# Patient Record
Sex: FEMALE | Employment: OTHER | URBAN - NONMETROPOLITAN AREA
[De-identification: names, ages, dates, MRNs, and addresses within clinical notes are randomized per-mention and may not be internally consistent; named-entity substitution may affect disease eponyms.]

---

## 2017-04-19 ENCOUNTER — OFFICE VISIT (OUTPATIENT)
Dept: CARDIOLOGY | Age: 77
End: 2017-04-19

## 2017-04-19 VITALS
BODY MASS INDEX: 34.39 KG/M2 | SYSTOLIC BLOOD PRESSURE: 130 MMHG | HEIGHT: 66 IN | HEART RATE: 82 BPM | DIASTOLIC BLOOD PRESSURE: 72 MMHG | WEIGHT: 214 LBS

## 2017-04-19 DIAGNOSIS — Z01.818 PRE-OP EVALUATION: ICD-10-CM

## 2017-04-19 DIAGNOSIS — E78.01 FAMILIAL HYPERCHOLESTEROLEMIA: ICD-10-CM

## 2017-04-19 DIAGNOSIS — R94.31 ABNORMAL ECG: ICD-10-CM

## 2017-04-19 DIAGNOSIS — I10 ESSENTIAL HYPERTENSION: Primary | ICD-10-CM

## 2017-04-19 DIAGNOSIS — Z01.811 PRE-OP CHEST EXAM: ICD-10-CM

## 2017-04-19 DIAGNOSIS — N28.9 RENAL INSUFFICIENCY: ICD-10-CM

## 2017-04-19 PROCEDURE — 4040F PNEUMOC VAC/ADMIN/RCVD: CPT | Performed by: NUCLEAR MEDICINE

## 2017-04-19 PROCEDURE — 1123F ACP DISCUSS/DSCN MKR DOCD: CPT | Performed by: NUCLEAR MEDICINE

## 2017-04-19 PROCEDURE — G8427 DOCREV CUR MEDS BY ELIG CLIN: HCPCS | Performed by: NUCLEAR MEDICINE

## 2017-04-19 PROCEDURE — 3017F COLORECTAL CA SCREEN DOC REV: CPT | Performed by: NUCLEAR MEDICINE

## 2017-04-19 PROCEDURE — 1036F TOBACCO NON-USER: CPT | Performed by: NUCLEAR MEDICINE

## 2017-04-19 PROCEDURE — G8417 CALC BMI ABV UP PARAM F/U: HCPCS | Performed by: NUCLEAR MEDICINE

## 2017-04-19 PROCEDURE — G8400 PT W/DXA NO RESULTS DOC: HCPCS | Performed by: NUCLEAR MEDICINE

## 2017-04-19 PROCEDURE — 1090F PRES/ABSN URINE INCON ASSESS: CPT | Performed by: NUCLEAR MEDICINE

## 2017-04-19 PROCEDURE — 99204 OFFICE O/P NEW MOD 45 MIN: CPT | Performed by: NUCLEAR MEDICINE

## 2017-04-19 RX ORDER — M-VIT,TX,IRON,MINS/CALC/FOLIC 27MG-0.4MG
1 TABLET ORAL DAILY
COMMUNITY

## 2017-04-19 ASSESSMENT — ENCOUNTER SYMPTOMS
PHOTOPHOBIA: 0
CONSTIPATION: 0
BLOOD IN STOOL: 0
RECTAL PAIN: 0
ABDOMINAL PAIN: 0
ANAL BLEEDING: 0
ABDOMINAL DISTENTION: 0
SHORTNESS OF BREATH: 1
NAUSEA: 0
COLOR CHANGE: 0
DIARRHEA: 0
CHEST TIGHTNESS: 0
BACK PAIN: 0
VOMITING: 0

## 2017-05-02 ENCOUNTER — TELEPHONE (OUTPATIENT)
Dept: CARDIOLOGY | Age: 77
End: 2017-05-02

## 2019-07-25 ENCOUNTER — OFFICE VISIT (OUTPATIENT)
Dept: CARDIOLOGY CLINIC | Age: 79
End: 2019-07-25
Payer: MEDICARE

## 2019-07-25 VITALS
SYSTOLIC BLOOD PRESSURE: 132 MMHG | BODY MASS INDEX: 34.99 KG/M2 | DIASTOLIC BLOOD PRESSURE: 78 MMHG | HEART RATE: 98 BPM | WEIGHT: 210 LBS | HEIGHT: 65 IN

## 2019-07-25 DIAGNOSIS — I10 ESSENTIAL HYPERTENSION: ICD-10-CM

## 2019-07-25 DIAGNOSIS — E78.5 HYPERLIPIDEMIA, UNSPECIFIED HYPERLIPIDEMIA TYPE: ICD-10-CM

## 2019-07-25 DIAGNOSIS — N18.4 CHRONIC KIDNEY DISEASE, STAGE IV (SEVERE) (HCC): ICD-10-CM

## 2019-07-25 DIAGNOSIS — R06.02 SOB (SHORTNESS OF BREATH) ON EXERTION: Primary | ICD-10-CM

## 2019-07-25 DIAGNOSIS — R53.83 FATIGUE, UNSPECIFIED TYPE: ICD-10-CM

## 2019-07-25 PROCEDURE — 1036F TOBACCO NON-USER: CPT | Performed by: NURSE PRACTITIONER

## 2019-07-25 PROCEDURE — 4040F PNEUMOC VAC/ADMIN/RCVD: CPT | Performed by: NURSE PRACTITIONER

## 2019-07-25 PROCEDURE — G8427 DOCREV CUR MEDS BY ELIG CLIN: HCPCS | Performed by: NURSE PRACTITIONER

## 2019-07-25 PROCEDURE — 99213 OFFICE O/P EST LOW 20 MIN: CPT | Performed by: NURSE PRACTITIONER

## 2019-07-25 PROCEDURE — 1090F PRES/ABSN URINE INCON ASSESS: CPT | Performed by: NURSE PRACTITIONER

## 2019-07-25 PROCEDURE — G8417 CALC BMI ABV UP PARAM F/U: HCPCS | Performed by: NURSE PRACTITIONER

## 2019-07-25 PROCEDURE — G8400 PT W/DXA NO RESULTS DOC: HCPCS | Performed by: NURSE PRACTITIONER

## 2019-07-25 PROCEDURE — 93000 ELECTROCARDIOGRAM COMPLETE: CPT | Performed by: NURSE PRACTITIONER

## 2019-07-25 PROCEDURE — 1123F ACP DISCUSS/DSCN MKR DOCD: CPT | Performed by: NURSE PRACTITIONER

## 2019-07-25 RX ORDER — AMLODIPINE BESYLATE AND BENAZEPRIL HYDROCHLORIDE 2.5; 1 MG/1; MG/1
1 CAPSULE ORAL DAILY
COMMUNITY
Start: 2019-06-11 | End: 2019-09-03

## 2019-07-25 RX ORDER — AMLODIPINE BESYLATE AND BENAZEPRIL HYDROCHLORIDE 5; 10 MG/1; MG/1
1 CAPSULE ORAL
COMMUNITY
End: 2019-07-25

## 2019-08-14 ENCOUNTER — HOSPITAL ENCOUNTER (OUTPATIENT)
Dept: NON INVASIVE DIAGNOSTICS | Age: 79
Discharge: HOME | End: 2019-08-14
Payer: MEDICARE

## 2019-08-14 DIAGNOSIS — R06.02 SOB (SHORTNESS OF BREATH) ON EXERTION: ICD-10-CM

## 2019-08-14 DIAGNOSIS — R53.83 FATIGUE, UNSPECIFIED TYPE: ICD-10-CM

## 2019-08-14 LAB
LV EF: 55 %
LVEF MODALITY: NORMAL

## 2019-08-14 PROCEDURE — 2709999900 HC NON-CHARGEABLE SUPPLY

## 2019-08-14 PROCEDURE — 93017 CV STRESS TEST TRACING ONLY: CPT | Performed by: NUCLEAR MEDICINE

## 2019-08-14 PROCEDURE — 3430000000 HC RX DIAGNOSTIC RADIOPHARMACEUTICAL: Performed by: NURSE PRACTITIONER

## 2019-08-14 PROCEDURE — 93306 TTE W/DOPPLER COMPLETE: CPT

## 2019-08-14 PROCEDURE — 6360000002 HC RX W HCPCS

## 2019-08-14 PROCEDURE — A9500 TC99M SESTAMIBI: HCPCS | Performed by: NURSE PRACTITIONER

## 2019-08-14 PROCEDURE — 78452 HT MUSCLE IMAGE SPECT MULT: CPT

## 2019-08-14 RX ADMIN — Medication 9.5 MILLICURIE: at 09:30

## 2019-08-14 RX ADMIN — Medication 30.8 MILLICURIE: at 10:22

## 2019-08-19 ENCOUNTER — TELEPHONE (OUTPATIENT)
Dept: CARDIOLOGY CLINIC | Age: 79
End: 2019-08-19

## 2019-09-03 ENCOUNTER — OFFICE VISIT (OUTPATIENT)
Dept: CARDIOLOGY CLINIC | Age: 79
End: 2019-09-03
Payer: MEDICARE

## 2019-09-03 VITALS
SYSTOLIC BLOOD PRESSURE: 116 MMHG | HEIGHT: 65 IN | BODY MASS INDEX: 34.49 KG/M2 | WEIGHT: 207 LBS | DIASTOLIC BLOOD PRESSURE: 72 MMHG | HEART RATE: 60 BPM

## 2019-09-03 DIAGNOSIS — E78.01 FAMILIAL HYPERCHOLESTEROLEMIA: ICD-10-CM

## 2019-09-03 DIAGNOSIS — I10 ESSENTIAL HYPERTENSION: Primary | ICD-10-CM

## 2019-09-03 PROCEDURE — G8427 DOCREV CUR MEDS BY ELIG CLIN: HCPCS | Performed by: NUCLEAR MEDICINE

## 2019-09-03 PROCEDURE — 4040F PNEUMOC VAC/ADMIN/RCVD: CPT | Performed by: NUCLEAR MEDICINE

## 2019-09-03 PROCEDURE — G8417 CALC BMI ABV UP PARAM F/U: HCPCS | Performed by: NUCLEAR MEDICINE

## 2019-09-03 PROCEDURE — G8400 PT W/DXA NO RESULTS DOC: HCPCS | Performed by: NUCLEAR MEDICINE

## 2019-09-03 PROCEDURE — 99213 OFFICE O/P EST LOW 20 MIN: CPT | Performed by: NUCLEAR MEDICINE

## 2019-09-03 PROCEDURE — 1090F PRES/ABSN URINE INCON ASSESS: CPT | Performed by: NUCLEAR MEDICINE

## 2019-09-03 PROCEDURE — 1036F TOBACCO NON-USER: CPT | Performed by: NUCLEAR MEDICINE

## 2019-09-03 PROCEDURE — 1123F ACP DISCUSS/DSCN MKR DOCD: CPT | Performed by: NUCLEAR MEDICINE

## 2019-09-03 RX ORDER — BENAZEPRIL HYDROCHLORIDE 10 MG/1
10 TABLET ORAL DAILY
Qty: 30 TABLET | Refills: 0 | Status: SHIPPED | OUTPATIENT
Start: 2019-09-03 | End: 2020-07-06

## 2019-09-03 RX ORDER — BENAZEPRIL HYDROCHLORIDE 10 MG/1
10 TABLET ORAL DAILY
Qty: 90 TABLET | Refills: 3 | Status: SHIPPED | OUTPATIENT
Start: 2019-09-03 | End: 2020-07-06 | Stop reason: SDUPTHER

## 2019-09-03 NOTE — PROGRESS NOTES
diet and exercise. Continue risk factor modification and medical management. Patient agreed with treatment plan. Follow up as directed.     Electronically signedby Cayden Carroll MD on 9/3/2019 at 11:00 AM

## 2020-07-06 ENCOUNTER — OFFICE VISIT (OUTPATIENT)
Dept: CARDIOLOGY CLINIC | Age: 80
End: 2020-07-06
Payer: MEDICARE

## 2020-07-06 VITALS
HEIGHT: 65 IN | SYSTOLIC BLOOD PRESSURE: 139 MMHG | WEIGHT: 207 LBS | HEART RATE: 78 BPM | BODY MASS INDEX: 34.49 KG/M2 | DIASTOLIC BLOOD PRESSURE: 72 MMHG

## 2020-07-06 PROCEDURE — 1036F TOBACCO NON-USER: CPT | Performed by: NUCLEAR MEDICINE

## 2020-07-06 PROCEDURE — G8427 DOCREV CUR MEDS BY ELIG CLIN: HCPCS | Performed by: NUCLEAR MEDICINE

## 2020-07-06 PROCEDURE — G8417 CALC BMI ABV UP PARAM F/U: HCPCS | Performed by: NUCLEAR MEDICINE

## 2020-07-06 PROCEDURE — 1123F ACP DISCUSS/DSCN MKR DOCD: CPT | Performed by: NUCLEAR MEDICINE

## 2020-07-06 PROCEDURE — 1090F PRES/ABSN URINE INCON ASSESS: CPT | Performed by: NUCLEAR MEDICINE

## 2020-07-06 PROCEDURE — 99213 OFFICE O/P EST LOW 20 MIN: CPT | Performed by: NUCLEAR MEDICINE

## 2020-07-06 PROCEDURE — 4040F PNEUMOC VAC/ADMIN/RCVD: CPT | Performed by: NUCLEAR MEDICINE

## 2020-07-06 PROCEDURE — G8400 PT W/DXA NO RESULTS DOC: HCPCS | Performed by: NUCLEAR MEDICINE

## 2020-07-06 RX ORDER — ATORVASTATIN CALCIUM 80 MG/1
40 TABLET, FILM COATED ORAL DAILY
Qty: 45 TABLET | Refills: 3 | Status: SHIPPED | OUTPATIENT
Start: 2020-07-06 | End: 2021-07-12 | Stop reason: ALTCHOICE

## 2020-07-06 RX ORDER — BENAZEPRIL HYDROCHLORIDE 10 MG/1
10 TABLET ORAL DAILY
Qty: 90 TABLET | Refills: 3 | Status: SHIPPED | OUTPATIENT
Start: 2020-07-06 | End: 2021-07-13

## 2020-07-06 NOTE — PROGRESS NOTES
Pt denies CP. Pt reports some SOB on exertion. Pt here for pre op clearance for left knee sx with Dr. Jade Taylor scheduled on 7-13-20.

## 2020-07-06 NOTE — PROGRESS NOTES
[address] [facility],[place] [address] [address] [address]  Dept: [phone]  Dept Fax: [phone]  Loc: [phone]    Visit Date: 7/6/2020    Brian Nj is a 67 y.o. female who presents todayfor:  Chief Complaint   Patient presents with    Cardiac Clearance    Cardiac Valve Problem    Hypertension    Hyperlipidemia     Going for knee surgery   Had a stress test and echo last year   Mild As  No ischemia   No chest pain  Some baseline dyspnea  Risk for CAD  BP is stable  No dizziness  No syncope  On statins for hyperlipidemia     HPI:  HPI  Past Medical History:   Diagnosis Date    Chronic kidney disease, stage IV (severe) ([facility] [address]     Osteoarthritis     Other and unspecified hyperlipidemia     Unspecified essential hypertension       Past Surgical History:   Procedure Laterality Date    ELBOW SURGERY      SHOULDER SURGERY  2016    TUMOR REMOVAL       Family History   Problem Relation Age of Onset    Atrial Fibrillation Mother      Social History     Tobacco Use    Smoking status: Never Smoker    Smokeless tobacco: Never Used   Substance Use Topics    Alcohol use: No      Current Outpatient Medications   Medication Sig Dispense Refill    Ferrous Gluconate (IRON 27 PO) Take by mouth      Probiotic Product (PROBIOTIC-10 PO) Take by mouth      benazepril (LOTENSIN) 10 MG tablet Take 1 tablet by mouth daily 90 tablet 3    Multiple Vitamins-Minerals (THERAPEUTIC MULTIVITAMIN-MINERALS) tablet Take 1 tablet by mouth daily      Coenzyme Q10 (COQ-10) 100 MG CAPS Take  by mouth daily.  atorvastatin (LIPITOR) 80 MG tablet Take 40 mg by mouth daily        No current facility-administered medications for this visit.       No Known Allergies  Health Maintenance   Topic Date Due    Potassium monitoring  1940    Creatinine monitoring  1940    Lipid screen  09/27/1951    DTaP/Tdap/Td vaccine (1 - Tdap) 09/27/1960    Shingles Vaccine (1 of 2) 09/27/1991    DEXA (modify frequency per FRAX score)  09/27/1996    Pneumococcal 65+ yrs at Risk Vaccine (1 of 2 - PCV13) 09/27/2006    Annual Wellness Visit (AWV)  06/23/2019    Flu vaccine (1) 09/01/2020    Hepatitis A vaccine  Aged Out    Hepatitis B vaccine  Aged Out    Hib vaccine  Aged Out    Meningococcal (ACWY) vaccine  Aged Out       Subjective:  Review of Systems  General:   No fever, no chills, some fatigue or weight loss  Pulmonary:    some dyspnea, no wheezing  Cardiac:    Denies recent chest pain,   GI:     No nausea or vomiting, no abdominal pain  Neuro:     No dizziness or light headedness,   Musculoskeletal:  No recent active issues  Extremities:   No edema, no obvious claudication       Objective:  Physical Exam  /72   Pulse 78   Ht 5' 5\" (1.651 m)   Wt 207 lb (93.9 kg)   BMI 34.45 kg/m²   General:   Well developed, well nourished  Lungs:   Clear to auscultation  Heart:    Normal S1 S2, Slight murmur. no rubs, no gallops  Abdomen:   Soft, non tender, no organomegalies, positive bowel sounds  Extremities:   No edema, no cyanosis, good peripheral pulses  Neurological:   Awake, alert, oriented. No obvious focal deficits  Musculoskelatal:  No obvious deformities    Assessment:      Diagnosis Orders   1. Essential hypertension     2. Familial hypercholesterolemia     3. Nonrheumatic aortic valve stenosis     cardiac fair for surgery   Clear other wise       Plan:  No follow-ups on file. As above  Clear   Continue risk factor modification and medical management  Thank you for allowing me to participate in the care of your patient. Please don't hesitate to contact me regarding any further issues related to the patient care    Orders Placed:  No orders of the defined types were placed in this encounter. Medications Prescribed:  No orders of the defined types were placed in this encounter. Discussed use, benefit, and side effects of prescribed medications.  All patient questions answered. Pt voicedunderstanding. Instructed to continue current medications, diet and exercise. Continue risk factor modification and medical management. Patient agreed with treatment plan. Follow up as directed.     Electronically signedby Kenji Guzman MD on 7/6/2020 at 12:54 PM

## 2020-11-03 PROBLEM — I10 ESSENTIAL HYPERTENSION: Status: RESOLVED | Noted: 2020-11-03 | Resolved: 2020-11-03

## 2021-07-12 ENCOUNTER — OFFICE VISIT (OUTPATIENT)
Dept: CARDIOLOGY CLINIC | Age: 81
End: 2021-07-12
Payer: MEDICARE

## 2021-07-12 VITALS
HEART RATE: 67 BPM | WEIGHT: 202 LBS | SYSTOLIC BLOOD PRESSURE: 129 MMHG | HEIGHT: 65 IN | DIASTOLIC BLOOD PRESSURE: 85 MMHG | BODY MASS INDEX: 33.66 KG/M2

## 2021-07-12 DIAGNOSIS — I35.0 NONRHEUMATIC AORTIC VALVE STENOSIS: ICD-10-CM

## 2021-07-12 DIAGNOSIS — I10 ESSENTIAL HYPERTENSION: Primary | ICD-10-CM

## 2021-07-12 DIAGNOSIS — E78.00 PURE HYPERCHOLESTEROLEMIA: ICD-10-CM

## 2021-07-12 DIAGNOSIS — R06.02 SOB (SHORTNESS OF BREATH) ON EXERTION: ICD-10-CM

## 2021-07-12 PROCEDURE — 4040F PNEUMOC VAC/ADMIN/RCVD: CPT | Performed by: NUCLEAR MEDICINE

## 2021-07-12 PROCEDURE — 1090F PRES/ABSN URINE INCON ASSESS: CPT | Performed by: NUCLEAR MEDICINE

## 2021-07-12 PROCEDURE — 93000 ELECTROCARDIOGRAM COMPLETE: CPT | Performed by: NUCLEAR MEDICINE

## 2021-07-12 PROCEDURE — G8417 CALC BMI ABV UP PARAM F/U: HCPCS | Performed by: NUCLEAR MEDICINE

## 2021-07-12 PROCEDURE — 1123F ACP DISCUSS/DSCN MKR DOCD: CPT | Performed by: NUCLEAR MEDICINE

## 2021-07-12 PROCEDURE — 1036F TOBACCO NON-USER: CPT | Performed by: NUCLEAR MEDICINE

## 2021-07-12 PROCEDURE — 99213 OFFICE O/P EST LOW 20 MIN: CPT | Performed by: NUCLEAR MEDICINE

## 2021-07-12 PROCEDURE — G8427 DOCREV CUR MEDS BY ELIG CLIN: HCPCS | Performed by: NUCLEAR MEDICINE

## 2021-07-12 PROCEDURE — G8400 PT W/DXA NO RESULTS DOC: HCPCS | Performed by: NUCLEAR MEDICINE

## 2021-07-12 RX ORDER — ATORVASTATIN CALCIUM 40 MG/1
20 TABLET, FILM COATED ORAL DAILY
COMMUNITY

## 2021-07-12 NOTE — PROGRESS NOTES
[facility] [address]  [place] [address]  Dept: [phone]  Dept Fax: [phone]  Loc: [phone]    Visit Date: 7/12/2021    Marce De La Cruz is a 79 y.o. female who presents todayfor:  Chief Complaint   Patient presents with    Check-Up    Hypertension    Hyperlipidemia    Cardiac Valve Problem   some stress at home  Some baseline dyspnea  Know mild AS  No chest pain  Some dyspnea  No dizziness  No syncope  On statins for hyperlipidemia  Higher BP at times  she is renal patient           HPI:  HPI  Past Medical History:   Diagnosis Date    Chronic kidney disease, stage IV (severe) (HCC)     Osteoarthritis     Other and unspecified hyperlipidemia     Unspecified essential hypertension       Past Surgical History:   Procedure Laterality Date    ELBOW SURGERY      SHOULDER SURGERY  2016    TUMOR REMOVAL       Family History   Problem Relation Age of Onset    Atrial Fibrillation Mother      Social History     Tobacco Use    Smoking status: Never Smoker    Smokeless tobacco: Never Used   Substance Use Topics    Alcohol use: No      Current Outpatient Medications   Medication Sig Dispense Refill    ZINC PO Take by mouth      Cholecalciferol (VITAMIN D-3 PO) Take by mouth daily      Cyanocobalamin (VITAMIN B 12 PO) Take by mouth daily      MAGNESIUM PO Take by mouth daily      atorvastatin (LIPITOR) 40 MG tablet Take 20 mg by mouth daily      benazepril (LOTENSIN) 10 MG tablet Take 1 tablet by mouth daily 90 tablet 3    Ferrous Gluconate (IRON 27 PO) Take by mouth      Probiotic Product (PROBIOTIC-10 PO) Take by mouth      Multiple Vitamins-Minerals (THERAPEUTIC MULTIVITAMIN-MINERALS) tablet Take 1 tablet by mouth daily      Coenzyme Q10 (COQ-10) 100 MG CAPS Take  by mouth daily. No current facility-administered medications for this visit.      No Known Allergies  Health Maintenance   Topic Date Due    Potassium monitoring  Never done    Creatinine monitoring  Never done    Hepatitis C screen  Never done    Lipid screen  Never done    COVID-19 Vaccine (1) Never done    DTaP/Tdap/Td vaccine (1 - Tdap) Never done    DEXA (modify frequency per FRAX score)  Never done    Pneumococcal 65+ yrs at Risk Vaccine (1 of 2 - PCV13) Never done   Mariely Arreguin Annual Wellness Visit (AWV)  Never done    Shingles Vaccine (2 of 2) 12/08/2020    Flu vaccine (1) 09/01/2021    Hepatitis A vaccine  Aged Out    Hepatitis B vaccine  Aged Out    Hib vaccine  Aged Out    Meningococcal (ACWY) vaccine  Aged Out       Subjective:  Review of Systems  General:   No fever, no chills, some fatigue or weight loss  Pulmonary:    some dyspnea, no wheezing  Cardiac:    Denies recent chest pain,   GI:     No nausea or vomiting, no abdominal pain  Neuro:    No dizziness or light headedness,   Musculoskeletal:  No recent active issues  Extremities:   No edema, no obvious claudication       Objective:  Physical Exam  /85   Pulse 67   Ht 5' 5\" (1.651 m)   Wt 202 lb (91.6 kg)   BMI 33.61 kg/m²   General:   Well developed, well nourished  Lungs:   Clear to auscultation  Heart:    Normal S1 S2, Slight murmur. no rubs, no gallops  Abdomen:   Soft, non tender, no organomegalies, positive bowel sounds  Extremities:   No edema, no cyanosis, good peripheral pulses  Neurological:   Awake, alert, oriented. No obvious focal deficits  Musculoskelatal:  No obvious deformities    Assessment:      Diagnosis Orders   1. Essential hypertension  EKG 12 lead   2. SOB (shortness of breath) on exertion  EKG 12 lead   3. Nonrheumatic aortic valve stenosis     4. Pure hypercholesterolemia     as above  Following her AS    ECG in office was done today. I reviewed the ECG. No acute findings    Plan:  No follow-ups on file.   Consider a follow up echo   Continue risk factor modification and medical management  Thank you for allowing me to participate in the care of your

## 2021-07-13 RX ORDER — BENAZEPRIL HYDROCHLORIDE 10 MG/1
TABLET ORAL
Qty: 90 TABLET | Refills: 3 | Status: SHIPPED | OUTPATIENT
Start: 2021-07-13 | End: 2022-07-08

## 2021-07-14 ENCOUNTER — HOSPITAL ENCOUNTER (OUTPATIENT)
Dept: NON INVASIVE DIAGNOSTICS | Age: 81
Discharge: HOME | End: 2021-07-14
Payer: MEDICARE

## 2021-07-14 DIAGNOSIS — I35.0 NONRHEUMATIC AORTIC VALVE STENOSIS: ICD-10-CM

## 2021-07-14 DIAGNOSIS — I10 ESSENTIAL HYPERTENSION: ICD-10-CM

## 2021-07-14 DIAGNOSIS — R06.02 SOB (SHORTNESS OF BREATH) ON EXERTION: ICD-10-CM

## 2021-07-14 DIAGNOSIS — E78.00 PURE HYPERCHOLESTEROLEMIA: ICD-10-CM

## 2021-07-14 LAB
LV EF: 60 %
LVEF MODALITY: NORMAL

## 2021-07-14 PROCEDURE — 93306 TTE W/DOPPLER COMPLETE: CPT

## 2022-07-08 RX ORDER — BENAZEPRIL HYDROCHLORIDE 10 MG/1
TABLET ORAL
Qty: 90 TABLET | Refills: 0 | Status: SHIPPED | OUTPATIENT
Start: 2022-07-08 | End: 2022-10-06

## 2022-07-11 ENCOUNTER — OFFICE VISIT (OUTPATIENT)
Dept: CARDIOLOGY CLINIC | Age: 82
End: 2022-07-11
Payer: MEDICARE

## 2022-07-11 VITALS
WEIGHT: 209.6 LBS | HEIGHT: 65 IN | BODY MASS INDEX: 34.92 KG/M2 | DIASTOLIC BLOOD PRESSURE: 80 MMHG | HEART RATE: 63 BPM | SYSTOLIC BLOOD PRESSURE: 138 MMHG

## 2022-07-11 DIAGNOSIS — E78.00 PURE HYPERCHOLESTEROLEMIA: ICD-10-CM

## 2022-07-11 DIAGNOSIS — R06.02 SOB (SHORTNESS OF BREATH) ON EXERTION: ICD-10-CM

## 2022-07-11 DIAGNOSIS — I10 ESSENTIAL HYPERTENSION: Primary | ICD-10-CM

## 2022-07-11 DIAGNOSIS — I35.0 NONRHEUMATIC AORTIC VALVE STENOSIS: ICD-10-CM

## 2022-07-11 PROCEDURE — 1090F PRES/ABSN URINE INCON ASSESS: CPT | Performed by: NUCLEAR MEDICINE

## 2022-07-11 PROCEDURE — G8417 CALC BMI ABV UP PARAM F/U: HCPCS | Performed by: NUCLEAR MEDICINE

## 2022-07-11 PROCEDURE — G8400 PT W/DXA NO RESULTS DOC: HCPCS | Performed by: NUCLEAR MEDICINE

## 2022-07-11 PROCEDURE — 1123F ACP DISCUSS/DSCN MKR DOCD: CPT | Performed by: NUCLEAR MEDICINE

## 2022-07-11 PROCEDURE — G8427 DOCREV CUR MEDS BY ELIG CLIN: HCPCS | Performed by: NUCLEAR MEDICINE

## 2022-07-11 PROCEDURE — 99213 OFFICE O/P EST LOW 20 MIN: CPT | Performed by: NUCLEAR MEDICINE

## 2022-07-11 PROCEDURE — 93000 ELECTROCARDIOGRAM COMPLETE: CPT | Performed by: NUCLEAR MEDICINE

## 2022-07-11 PROCEDURE — 1036F TOBACCO NON-USER: CPT | Performed by: NUCLEAR MEDICINE

## 2022-07-11 NOTE — PROGRESS NOTES
[address] [facility] [place] [address]  [place] [address]  Dept: [phone]  Dept Fax: [phone]  Loc: [phone]    Visit Date: 7/11/2022    Charley Estrada is a [de-identified] y.o. female who presents todayfor:  Chief Complaint   Patient presents with    Follow-up    Valvular Heart Disease    Hypertension   some baseline dyspnea  No changes from a year   Mild to moderate CAD  No chest pain   No dizziness  No syncope  BP is stable   No arrhythmia       HPI:  HPI  Past Medical History:   Diagnosis Date    Chronic kidney disease, stage IV (severe) (HCC)     Osteoarthritis     Other and unspecified hyperlipidemia     Unspecified essential hypertension       Past Surgical History:   Procedure Laterality Date    ELBOW SURGERY      SHOULDER SURGERY  2016    TUMOR REMOVAL       Family History   Problem Relation Age of Onset    Atrial Fibrillation Mother      Social History     Tobacco Use    Smoking status: Never Smoker    Smokeless tobacco: Never Used   Substance Use Topics    Alcohol use: No      Current Outpatient Medications   Medication Sig Dispense Refill    benazepril (LOTENSIN) 10 MG tablet TAKE 1 TABLET DAILY 90 tablet 0    ZINC PO Take by mouth      Cholecalciferol (VITAMIN D-3 PO) Take by mouth daily      Cyanocobalamin (VITAMIN B 12 PO) Take by mouth daily      MAGNESIUM PO Take by mouth daily      atorvastatin (LIPITOR) 40 MG tablet Take 20 mg by mouth daily      Ferrous Gluconate (IRON 27 PO) Take by mouth      Probiotic Product (PROBIOTIC-10 PO) Take by mouth      Multiple Vitamins-Minerals (THERAPEUTIC MULTIVITAMIN-MINERALS) tablet Take 1 tablet by mouth daily      Coenzyme Q10 (COQ-10) 100 MG CAPS Take  by mouth daily. No current facility-administered medications for this visit.      No Known Allergies  Health Maintenance   Topic Date Due    Annual Wellness Visit (AWV)  Never done    COVID-19 Vaccine (1) Never done   [facility] Pneumococcal 65+ years Vaccine (1 - PCV) Never done    Lipids  Never done    Depression Screen  Never done    DTaP/Tdap/Td vaccine (1 - Tdap) Never done    DEXA (modify frequency per FRAX score)  Never done    Flu vaccine (1) 09/01/2022    Shingles vaccine  Completed    Hepatitis A vaccine  Aged Out    Hepatitis B vaccine  Aged Out    Hib vaccine  Aged Out    Meningococcal (ACWY) vaccine  Aged Out       Subjective:  Review of Systems  General:   No fever, no chills, No fatigue or weight loss  Pulmonary:    No dyspnea, no wheezing  Cardiac:    Denies recent chest pain,   GI:     No nausea or vomiting, no abdominal pain  Neuro:    No dizziness or light headedness,   Musculoskeletal:  No recent active issues  Extremities:   No edema, no obvious claudication       Objective:  Physical Exam  /80   Pulse 63   Ht 5' 5\" (1.651 m)   Wt 209 lb 9.6 oz (95.1 kg)   BMI 34.88 kg/m²   General:   Well developed, well nourished  Lungs:   Clear to auscultation  Heart:    Normal S1 S2, Slight murmur. no rubs, no gallops  Abdomen:   Soft, non tender, no organomegalies, positive bowel sounds  Extremities:   No edema, no cyanosis, good peripheral pulses  Neurological:   Awake, alert, oriented. No obvious focal deficits  Musculoskelatal:  No obvious deformities    Assessment:      Diagnosis Orders   1. Essential hypertension  EKG 12 lead   2. SOB (shortness of breath) on exertion  EKG 12 lead   3. Nonrheumatic aortic valve stenosis  EKG 12 lead   4. Pure hypercholesterolemia  EKG 12 lead   as above  Cardiac fair for now   ECG in office was done today. I reviewed the ECG. No acute findings    Plan:  No follow-ups on file. As above  Continue risk factor modification and medical management  ,Thank you for allowing me to participate in the care of your patient.  Please don't hesitate to contact me regarding any further issues related to the patient care    Orders Placed:  Orders Placed This Encounter   Procedures    EKG 12 lead     Order Specific Question:   Reason for Exam?     Answer: Other       Medications Prescribed:  No orders of the defined types were placed in this encounter. Discussed use, benefit, and side effects of prescribed medications. All patient questions answered. Pt voicedunderstanding. Instructed to continue current medications, diet and exercise. Continue risk factor modification and medical management. Patient agreed with treatment plan. Follow up as directed.     Electronically signedby Gisselle Mix MD on 7/11/2022 at 10:05 AM

## 2022-07-14 LAB
ABSOLUTE BASO #: 0 /CMM (ref 0–200)
ABSOLUTE EOS #: 400 /CMM (ref 0–500)
ABSOLUTE LYMPH #: 1200 /CMM (ref 1000–4800)
ABSOLUTE MONO #: 300 /CMM (ref 0–800)
ABSOLUTE NEUT #: 3000 /CMM (ref 1800–7700)
ANION GAP SERPL CALCULATED.3IONS-SCNC: 7 MMOL/L (ref 4–12)
APPEARANCE: CLEAR
BASOPHILS RELATIVE PERCENT: 0.9 % (ref 0–2)
BILIRUBIN URINE: NEGATIVE
BUN BLDV-MCNC: 17 MG/DL (ref 7–20)
CALCIUM SERPL-MCNC: 9.1 MG/DL (ref 8.8–10.5)
CHLORIDE BLD-SCNC: 109 MEQ/L (ref 101–111)
CO2: 26 MEQ/L (ref 21–32)
COLOR: YELLOW
CREAT SERPL-MCNC: 1.22 MG/DL (ref 0.6–1.3)
CREATININE CLEARANCE: 42
CREATININE, RANDOM URINE: 49.5 MG/DL
EOSINOPHILS RELATIVE PERCENT: 7.6 % (ref 0–6)
GLUCOSE URINE: NEGATIVE
GLUCOSE: 98 MG/DL (ref 70–110)
HCT VFR BLD CALC: 40.7 % (ref 35–44)
HEMOGLOBIN: 13.7 GM/DL (ref 12–15)
KETONES, URINE: NEGATIVE
LEUKOCYTES, UA: ABNORMAL
LYMPHOCYTES RELATIVE PERCENT: 24.3 % (ref 15–45)
MAGNESIUM: 2 MG/DL (ref 1.8–2.5)
MCH RBC QN AUTO: 29.5 PG (ref 27.5–33)
MCHC RBC AUTO-ENTMCNC: 33.8 GM/DL (ref 33–36)
MCV RBC AUTO: 87.3 CU MIC (ref 80–97)
MICROALBUMIN UR-MCNC: < 7 MG/L
MICROALBUMIN/CREAT UR-RTO: < 14.1 MG/GM (ref 0–30)
MONOCYTES RELATIVE PERCENT: 6.4 % (ref 2–10)
NEUTROPHILS RELATIVE PERCENT: 60.8 % (ref 40–70)
NITRITE, URINE: NEGATIVE
NUCLEATED RBCS: 0 /100 WBC
PARATHYROID HORMONE INTACT: 20.8 U/ML (ref 12–88)
PDW BLD-RTO: 14.6 % (ref 12–16)
PH, URINE: 6.5 (ref 5–8)
PHOSPHORUS: 3.2 MG/DL (ref 2.4–4.7)
PLATELET # BLD: 243 TH/CMM (ref 150–400)
POTASSIUM SERPL-SCNC: 4.2 MEQ/L (ref 3.6–5)
PROTEIN, URINE, RANDOM: < 4 MG/DL
PROTEIN, URINE: NEGATIVE
PROTEIN/CREAT RATIO: < 0.08 G/1.73M2
RBC # BLD: 4.66 MIL/CMM (ref 4–5.1)
RBC URINE: ABNORMAL /HPF
SODIUM BLD-SCNC: 142 MEQ/L (ref 135–145)
SPECIFIC GRAVITY, URINE: 1.01 (ref 1–1.03)
SQUAMOUS EPITHELIAL: ABNORMAL /HPF
URINALYSIS REFLEX: NO
URINE HGB: NEGATIVE
UROBILINOGEN, URINE: ABNORMAL (ref 0.2–1)
VITAMIN D 25-HYDROXY: 59.5 NG/ML (ref 30–100)
WBC # BLD: 4.9 TH/CMM (ref 4.4–10.5)
WBC URINE: ABNORMAL /HPF

## 2022-10-06 RX ORDER — BENAZEPRIL HYDROCHLORIDE 10 MG/1
TABLET ORAL
Qty: 90 TABLET | Refills: 2 | Status: SHIPPED | OUTPATIENT
Start: 2022-10-06

## 2023-04-06 PROBLEM — N13.30 HYDRONEPHROSIS: Status: ACTIVE | Noted: 2023-04-06

## 2023-05-10 ENCOUNTER — OFFICE VISIT (OUTPATIENT)
Dept: FAMILY MEDICINE CLINIC | Age: 83
End: 2023-05-10

## 2023-05-10 VITALS
RESPIRATION RATE: 18 BRPM | DIASTOLIC BLOOD PRESSURE: 68 MMHG | HEIGHT: 63 IN | HEART RATE: 75 BPM | SYSTOLIC BLOOD PRESSURE: 116 MMHG | WEIGHT: 204.8 LBS | BODY MASS INDEX: 36.29 KG/M2 | OXYGEN SATURATION: 95 % | TEMPERATURE: 97.9 F

## 2023-05-10 DIAGNOSIS — Z00.00 INITIAL MEDICARE ANNUAL WELLNESS VISIT: Primary | ICD-10-CM

## 2023-05-10 RX ORDER — AMLODIPINE BESYLATE AND BENAZEPRIL HYDROCHLORIDE 5; 10 MG/1; MG/1
CAPSULE ORAL
COMMUNITY
Start: 2013-03-20

## 2023-05-10 ASSESSMENT — PATIENT HEALTH QUESTIONNAIRE - PHQ9
SUM OF ALL RESPONSES TO PHQ QUESTIONS 1-9: 0
SUM OF ALL RESPONSES TO PHQ QUESTIONS 1-9: 0
2. FEELING DOWN, DEPRESSED OR HOPELESS: 0
SUM OF ALL RESPONSES TO PHQ9 QUESTIONS 1 & 2: 0
SUM OF ALL RESPONSES TO PHQ QUESTIONS 1-9: 0
1. LITTLE INTEREST OR PLEASURE IN DOING THINGS: 0
SUM OF ALL RESPONSES TO PHQ QUESTIONS 1-9: 0

## 2023-05-10 ASSESSMENT — LIFESTYLE VARIABLES
HOW OFTEN DO YOU HAVE A DRINK CONTAINING ALCOHOL: NEVER
HOW MANY STANDARD DRINKS CONTAINING ALCOHOL DO YOU HAVE ON A TYPICAL DAY: PATIENT DOES NOT DRINK

## 2023-05-10 NOTE — PROGRESS NOTES
Care Team:  EVELINE Gaming CNP as PCP - General (Family Medicine)  EVELINE Gaming CNP as PCP - Empaneled Provider  Severiano Logan MD as Cardiologist (Cardiology)     Reviewed and updated this visit:  Tobacco  Allergies  Meds  Med Hx  Surg Hx  Soc Hx  Fam Hx             EVELINE Clarke CNP

## 2023-05-10 NOTE — PATIENT INSTRUCTIONS
online. You need 3227-3604 mg of calcium and 8763-5218 IU of vitamin D per day. It is possible to meet your calcium requirement with diet alone, but a vitamin D supplement is usually necessary to meet this goal.  When exposed to the sun, use a sunscreen that protects against both UVA and UVB radiation with an SPF of 30 or greater. Reapply every 2 to 3 hours or after sweating, drying off with a towel, or swimming. Always wear a seat belt when traveling in a car. Always wear a helmet when riding a bicycle or motorcycle.

## 2023-06-17 DIAGNOSIS — M15.9 PRIMARY OSTEOARTHRITIS INVOLVING MULTIPLE JOINTS: ICD-10-CM

## 2023-06-17 DIAGNOSIS — M79.89 LEG SWELLING: ICD-10-CM

## 2023-06-19 RX ORDER — GABAPENTIN 100 MG/1
CAPSULE ORAL
Qty: 30 CAPSULE | Refills: 2 | Status: SHIPPED | OUTPATIENT
Start: 2023-06-19 | End: 2023-06-21 | Stop reason: SDUPTHER

## 2023-06-19 RX ORDER — FUROSEMIDE 20 MG/1
TABLET ORAL
Qty: 90 TABLET | Refills: 3 | Status: SHIPPED | OUTPATIENT
Start: 2023-06-19

## 2023-06-21 ENCOUNTER — OFFICE VISIT (OUTPATIENT)
Dept: FAMILY MEDICINE CLINIC | Age: 83
End: 2023-06-21
Payer: MEDICARE

## 2023-06-21 VITALS
BODY MASS INDEX: 35.61 KG/M2 | HEART RATE: 63 BPM | DIASTOLIC BLOOD PRESSURE: 60 MMHG | SYSTOLIC BLOOD PRESSURE: 130 MMHG | RESPIRATION RATE: 18 BRPM | WEIGHT: 202.6 LBS | OXYGEN SATURATION: 97 % | TEMPERATURE: 97.2 F

## 2023-06-21 DIAGNOSIS — E66.01 SEVERE OBESITY (BMI 35.0-39.9) WITH COMORBIDITY (HCC): ICD-10-CM

## 2023-06-21 DIAGNOSIS — M15.9 PRIMARY OSTEOARTHRITIS INVOLVING MULTIPLE JOINTS: Primary | ICD-10-CM

## 2023-06-21 DIAGNOSIS — M79.89 LEG SWELLING: ICD-10-CM

## 2023-06-21 PROCEDURE — 3078F DIAST BP <80 MM HG: CPT | Performed by: NURSE PRACTITIONER

## 2023-06-21 PROCEDURE — G8400 PT W/DXA NO RESULTS DOC: HCPCS | Performed by: NURSE PRACTITIONER

## 2023-06-21 PROCEDURE — G8427 DOCREV CUR MEDS BY ELIG CLIN: HCPCS | Performed by: NURSE PRACTITIONER

## 2023-06-21 PROCEDURE — 1090F PRES/ABSN URINE INCON ASSESS: CPT | Performed by: NURSE PRACTITIONER

## 2023-06-21 PROCEDURE — 1036F TOBACCO NON-USER: CPT | Performed by: NURSE PRACTITIONER

## 2023-06-21 PROCEDURE — G8417 CALC BMI ABV UP PARAM F/U: HCPCS | Performed by: NURSE PRACTITIONER

## 2023-06-21 PROCEDURE — 99214 OFFICE O/P EST MOD 30 MIN: CPT | Performed by: NURSE PRACTITIONER

## 2023-06-21 PROCEDURE — 1123F ACP DISCUSS/DSCN MKR DOCD: CPT | Performed by: NURSE PRACTITIONER

## 2023-06-21 PROCEDURE — 3075F SYST BP GE 130 - 139MM HG: CPT | Performed by: NURSE PRACTITIONER

## 2023-06-21 RX ORDER — GABAPENTIN 100 MG/1
100 CAPSULE ORAL 3 TIMES DAILY
Qty: 270 CAPSULE | Refills: 0 | Status: SHIPPED | OUTPATIENT
Start: 2023-06-21 | End: 2023-09-19

## 2023-06-21 ASSESSMENT — ENCOUNTER SYMPTOMS
EYE DISCHARGE: 0
DIARRHEA: 0
CONSTIPATION: 0
CHEST TIGHTNESS: 0
RHINORRHEA: 0
COUGH: 0
SHORTNESS OF BREATH: 0
ABDOMINAL PAIN: 0
VOMITING: 0

## 2023-06-21 ASSESSMENT — PATIENT HEALTH QUESTIONNAIRE - PHQ9
SUM OF ALL RESPONSES TO PHQ QUESTIONS 1-9: 0
1. LITTLE INTEREST OR PLEASURE IN DOING THINGS: 0
SUM OF ALL RESPONSES TO PHQ QUESTIONS 1-9: 0
SUM OF ALL RESPONSES TO PHQ QUESTIONS 1-9: 0
SUM OF ALL RESPONSES TO PHQ9 QUESTIONS 1 & 2: 0
2. FEELING DOWN, DEPRESSED OR HOPELESS: 0
SUM OF ALL RESPONSES TO PHQ QUESTIONS 1-9: 0

## 2023-06-21 NOTE — PROGRESS NOTES
is not nervous/anxious. Allergies   Allergen Reactions    Aspirin     Nsaids Other (See Comments)     Was told should not take NSAID's due to CKD        Outpatient Medications Prior to Visit   Medication Sig Dispense Refill    furosemide (LASIX) 20 MG tablet TAKE 1 TABLET BY MOUTH DAILY AS  NEEDED FOR SWELLING 90 tablet 3    amLODIPine-benazepril (LOTREL) 5-10 MG per capsule Take by mouth      benazepril (LOTENSIN) 10 MG tablet TAKE 1 TABLET DAILY 90 tablet 2    ZINC PO Take by mouth      Cholecalciferol (VITAMIN D-3 PO) Take by mouth daily      Cyanocobalamin (VITAMIN B 12 PO) Take by mouth daily      MAGNESIUM PO Take by mouth daily      atorvastatin (LIPITOR) 40 MG tablet Take 0.5 tablets by mouth daily      Ferrous Gluconate (IRON 27 PO) Take by mouth as needed      Probiotic Product (PROBIOTIC-10 PO) Take by mouth      Multiple Vitamins-Minerals (THERAPEUTIC MULTIVITAMIN-MINERALS) tablet Take 1 tablet by mouth daily      Coenzyme Q10 (COQ-10) 100 MG CAPS Take  by mouth daily. gabapentin (NEURONTIN) 100 MG capsule TAKE 1 CAPSULE BY MOUTH AT  BEDTIME X 1 WEEK, THEN 1 TWICE  DAILY X 1 WEEK, THEN 1 CAPSULE 3 TIMES DAILY 30 capsule 2     No facility-administered medications prior to visit.         Past Medical History:   Diagnosis Date    Chronic kidney disease, stage IV (severe) (HCC)     Osteoarthritis     Other and unspecified hyperlipidemia     Unspecified essential hypertension         Social History     Tobacco Use    Smoking status: Never    Smokeless tobacco: Never   Substance Use Topics    Alcohol use: No        Past Surgical History:   Procedure Laterality Date    ELBOW SURGERY      SHOULDER SURGERY  2016    TUMOR REMOVAL         Family History   Problem Relation Age of Onset    Atrial Fibrillation Mother        Objective       /60 (Site: Right Upper Arm, Position: Sitting, Cuff Size: Large Adult)   Pulse 63   Temp 97.2 °F (36.2 °C) (Temporal)   Resp 18   Wt 202 lb 9.6 oz (91.9 kg)

## 2023-06-28 RX ORDER — BENAZEPRIL HYDROCHLORIDE 10 MG/1
TABLET ORAL
Qty: 90 TABLET | Refills: 0 | Status: SHIPPED | OUTPATIENT
Start: 2023-06-28

## 2023-07-10 ENCOUNTER — OFFICE VISIT (OUTPATIENT)
Dept: CARDIOLOGY CLINIC | Age: 83
End: 2023-07-10
Payer: MEDICARE

## 2023-07-10 VITALS
HEIGHT: 64 IN | WEIGHT: 202.6 LBS | BODY MASS INDEX: 34.59 KG/M2 | SYSTOLIC BLOOD PRESSURE: 136 MMHG | HEART RATE: 75 BPM | DIASTOLIC BLOOD PRESSURE: 72 MMHG

## 2023-07-10 DIAGNOSIS — I10 PRIMARY HYPERTENSION: ICD-10-CM

## 2023-07-10 DIAGNOSIS — I35.0 NONRHEUMATIC AORTIC VALVE STENOSIS: Primary | ICD-10-CM

## 2023-07-10 DIAGNOSIS — I25.10 CORONARY ARTERY DISEASE INVOLVING NATIVE CORONARY ARTERY OF NATIVE HEART WITHOUT ANGINA PECTORIS: ICD-10-CM

## 2023-07-10 PROCEDURE — 1036F TOBACCO NON-USER: CPT | Performed by: NUCLEAR MEDICINE

## 2023-07-10 PROCEDURE — 1123F ACP DISCUSS/DSCN MKR DOCD: CPT | Performed by: NUCLEAR MEDICINE

## 2023-07-10 PROCEDURE — 3078F DIAST BP <80 MM HG: CPT | Performed by: NUCLEAR MEDICINE

## 2023-07-10 PROCEDURE — G8417 CALC BMI ABV UP PARAM F/U: HCPCS | Performed by: NUCLEAR MEDICINE

## 2023-07-10 PROCEDURE — 1090F PRES/ABSN URINE INCON ASSESS: CPT | Performed by: NUCLEAR MEDICINE

## 2023-07-10 PROCEDURE — G8427 DOCREV CUR MEDS BY ELIG CLIN: HCPCS | Performed by: NUCLEAR MEDICINE

## 2023-07-10 PROCEDURE — 93000 ELECTROCARDIOGRAM COMPLETE: CPT | Performed by: NUCLEAR MEDICINE

## 2023-07-10 PROCEDURE — 99213 OFFICE O/P EST LOW 20 MIN: CPT | Performed by: NUCLEAR MEDICINE

## 2023-07-10 PROCEDURE — 3075F SYST BP GE 130 - 139MM HG: CPT | Performed by: NUCLEAR MEDICINE

## 2023-07-10 PROCEDURE — G8400 PT W/DXA NO RESULTS DOC: HCPCS | Performed by: NUCLEAR MEDICINE

## 2023-07-10 RX ORDER — BENAZEPRIL HYDROCHLORIDE 10 MG/1
10 TABLET ORAL DAILY
Qty: 90 TABLET | Refills: 3 | Status: SHIPPED | OUTPATIENT
Start: 2023-07-10

## 2023-07-10 NOTE — PROGRESS NOTES
Patient here for check up. EKG done today.
risk factor modification and medical management  Thank you for allowing me to participate in the care of your patient. Please don't hesitate to contact me regarding any further issues related to the patient care    Orders Placed:  Orders Placed This Encounter   Procedures    EKG 12 lead     Order Specific Question:   Reason for Exam?     Answer: Other       Prescribed:  No orders of the defined types were placed in this encounter. Discussed use, benefit, and side effects of prescribed medications. All patient questions answered. Pt voicedunderstanding. Instructed to continue current medications, diet and exercise. Continue risk factor modification and medical management. Patient agreed with treatment plan. Follow up as directed.     Electronically signedby David Lopez MD on 7/10/2023 at 10:38 AM

## 2023-07-17 ENCOUNTER — HOSPITAL ENCOUNTER (OUTPATIENT)
Dept: NON INVASIVE DIAGNOSTICS | Age: 83
Discharge: HOME | End: 2023-07-17
Attending: NUCLEAR MEDICINE
Payer: MEDICARE

## 2023-07-17 DIAGNOSIS — I10 PRIMARY HYPERTENSION: ICD-10-CM

## 2023-07-17 DIAGNOSIS — I35.0 NONRHEUMATIC AORTIC VALVE STENOSIS: ICD-10-CM

## 2023-07-17 DIAGNOSIS — I25.10 CORONARY ARTERY DISEASE INVOLVING NATIVE CORONARY ARTERY OF NATIVE HEART WITHOUT ANGINA PECTORIS: ICD-10-CM

## 2023-07-17 LAB
LV EF: 60 %
LVEF MODALITY: NORMAL

## 2023-07-17 PROCEDURE — 93306 TTE W/DOPPLER COMPLETE: CPT

## 2023-07-18 ENCOUNTER — HOSPITAL ENCOUNTER (OUTPATIENT)
Age: 83
Setting detail: SPECIMEN
Discharge: HOME | End: 2023-07-18

## 2023-07-18 ENCOUNTER — NURSE ONLY (OUTPATIENT)
Dept: FAMILY MEDICINE CLINIC | Age: 83
End: 2023-07-18
Payer: MEDICARE

## 2023-07-18 DIAGNOSIS — I10 ESSENTIAL HYPERTENSION, MALIGNANT: ICD-10-CM

## 2023-07-18 DIAGNOSIS — N18.32 CHRONIC KIDNEY DISEASE (CKD) STAGE G3B/A1, MODERATELY DECREASED GLOMERULAR FILTRATION RATE (GFR) BETWEEN 30-44 ML/MIN/1.73 SQUARE METER AND ALBUMINURIA CREATININE RATIO LESS THAN 30 MG/G (HCC): Primary | ICD-10-CM

## 2023-07-18 PROCEDURE — 99999 PR OFFICE/OUTPT VISIT,PROCEDURE ONLY: CPT | Performed by: NURSE PRACTITIONER

## 2023-07-18 PROCEDURE — 36415 COLL VENOUS BLD VENIPUNCTURE: CPT | Performed by: NURSE PRACTITIONER

## 2023-07-18 NOTE — PROGRESS NOTES
Venipuncture obtained from left arm. Patient tolerated the procedure without complications or complaints.     1 pst

## 2023-07-19 LAB
ANION GAP SERPL CALCULATED.3IONS-SCNC: 13 MMOL/L (ref 9–17)
BUN SERPL-MCNC: 18 MG/DL (ref 8–23)
CALCIUM SERPL-MCNC: 9.7 MG/DL (ref 8.6–10.4)
CHLORIDE SERPL-SCNC: 105 MMOL/L (ref 98–107)
CO2 SERPL-SCNC: 23 MMOL/L (ref 20–31)
CREAT SERPL-MCNC: 1.3 MG/DL (ref 0.5–0.9)
GFR SERPL CREATININE-BSD FRML MDRD: 41 ML/MIN/1.73M2
GLUCOSE SERPL-MCNC: 102 MG/DL (ref 70–99)
MAGNESIUM SERPL-MCNC: 2.4 MG/DL (ref 1.6–2.6)
POTASSIUM SERPL-SCNC: 4.5 MMOL/L (ref 3.7–5.3)
SODIUM SERPL-SCNC: 141 MMOL/L (ref 135–144)

## 2023-08-15 RX ORDER — ATORVASTATIN CALCIUM 40 MG/1
20 TABLET, FILM COATED ORAL DAILY
Qty: 30 TABLET | Refills: 3 | Status: SHIPPED | OUTPATIENT
Start: 2023-08-15

## 2023-08-30 DIAGNOSIS — M15.9 PRIMARY OSTEOARTHRITIS INVOLVING MULTIPLE JOINTS: ICD-10-CM

## 2023-08-30 RX ORDER — GABAPENTIN 100 MG/1
100 CAPSULE ORAL 3 TIMES DAILY
Qty: 270 CAPSULE | Refills: 0 | Status: SHIPPED | OUTPATIENT
Start: 2023-08-30 | End: 2023-11-28

## 2023-09-21 ENCOUNTER — OFFICE VISIT (OUTPATIENT)
Dept: FAMILY MEDICINE CLINIC | Age: 83
End: 2023-09-21
Payer: MEDICARE

## 2023-09-21 ENCOUNTER — HOSPITAL ENCOUNTER (OUTPATIENT)
Age: 83
Setting detail: SPECIMEN
Discharge: HOME | End: 2023-09-21

## 2023-09-21 VITALS
WEIGHT: 201.4 LBS | TEMPERATURE: 98.1 F | RESPIRATION RATE: 18 BRPM | BODY MASS INDEX: 34.57 KG/M2 | OXYGEN SATURATION: 97 % | DIASTOLIC BLOOD PRESSURE: 70 MMHG | HEART RATE: 97 BPM | SYSTOLIC BLOOD PRESSURE: 106 MMHG

## 2023-09-21 DIAGNOSIS — I10 ESSENTIAL HYPERTENSION, MALIGNANT: ICD-10-CM

## 2023-09-21 DIAGNOSIS — M15.9 PRIMARY OSTEOARTHRITIS INVOLVING MULTIPLE JOINTS: Primary | ICD-10-CM

## 2023-09-21 DIAGNOSIS — N18.32 CHRONIC KIDNEY DISEASE (CKD) STAGE G3B/A1, MODERATELY DECREASED GLOMERULAR FILTRATION RATE (GFR) BETWEEN 30-44 ML/MIN/1.73 SQUARE METER AND ALBUMINURIA CREATININE RATIO LESS THAN 30 MG/G (HCC): ICD-10-CM

## 2023-09-21 DIAGNOSIS — E66.01 SEVERE OBESITY (BMI 35.0-39.9) WITH COMORBIDITY (HCC): ICD-10-CM

## 2023-09-21 DIAGNOSIS — Z53.20 OSTEOPOROSIS SCREENING DECLINED: ICD-10-CM

## 2023-09-21 PROBLEM — M17.11 PRIMARY OSTEOARTHRITIS OF RIGHT KNEE: Status: ACTIVE | Noted: 2023-09-21

## 2023-09-21 PROBLEM — M65.311 TRIGGER THUMB, RIGHT THUMB: Status: ACTIVE | Noted: 2023-09-21

## 2023-09-21 PROBLEM — M65.312 TRIGGER THUMB, LEFT THUMB: Status: ACTIVE | Noted: 2023-09-21

## 2023-09-21 PROBLEM — Z96.652 PRESENCE OF LEFT ARTIFICIAL KNEE JOINT: Status: ACTIVE | Noted: 2023-09-21

## 2023-09-21 PROBLEM — M17.0 PRIMARY OSTEOARTHRITIS OF BOTH KNEES: Status: ACTIVE | Noted: 2023-09-21

## 2023-09-21 LAB
ALBUMIN SERPL-MCNC: 4.1 G/DL (ref 3.5–5.2)
ALBUMIN/GLOB SERPL: 1.3 {RATIO} (ref 1–2.5)
ALP SERPL-CCNC: 68 U/L (ref 35–104)
ALT SERPL-CCNC: 21 U/L (ref 5–33)
ANION GAP SERPL CALCULATED.3IONS-SCNC: 13 MMOL/L (ref 9–17)
AST SERPL-CCNC: 26 U/L
BASOPHILS # BLD: 0.04 K/UL (ref 0–0.2)
BASOPHILS NFR BLD: 1 % (ref 0–2)
BILIRUB SERPL-MCNC: 0.4 MG/DL (ref 0.3–1.2)
BUN SERPL-MCNC: 20 MG/DL (ref 8–23)
CALCIUM SERPL-MCNC: 9.1 MG/DL (ref 8.6–10.4)
CHLORIDE SERPL-SCNC: 103 MMOL/L (ref 98–107)
CHOLEST SERPL-MCNC: 156 MG/DL
CHOLESTEROL/HDL RATIO: 3.1
CO2 SERPL-SCNC: 24 MMOL/L (ref 20–31)
CREAT SERPL-MCNC: 1.3 MG/DL (ref 0.5–0.9)
EOSINOPHIL # BLD: 0.27 K/UL (ref 0–0.44)
EOSINOPHILS RELATIVE PERCENT: 5 % (ref 1–4)
ERYTHROCYTE [DISTWIDTH] IN BLOOD BY AUTOMATED COUNT: 16.6 % (ref 11.8–14.4)
GFR SERPL CREATININE-BSD FRML MDRD: 41 ML/MIN/1.73M2
GLUCOSE SERPL-MCNC: 97 MG/DL (ref 70–99)
HCT VFR BLD AUTO: 41.7 % (ref 36.3–47.1)
HDLC SERPL-MCNC: 51 MG/DL
HGB BLD-MCNC: 13 G/DL (ref 11.9–15.1)
IMM GRANULOCYTES # BLD AUTO: <0.03 K/UL (ref 0–0.3)
IMM GRANULOCYTES NFR BLD: 0 %
LDLC SERPL CALC-MCNC: 87 MG/DL (ref 0–130)
LYMPHOCYTES NFR BLD: 1.4 K/UL (ref 1.1–3.7)
LYMPHOCYTES RELATIVE PERCENT: 23 % (ref 24–43)
MAGNESIUM SERPL-MCNC: 2.1 MG/DL (ref 1.6–2.6)
MCH RBC QN AUTO: 27.7 PG (ref 25.2–33.5)
MCHC RBC AUTO-ENTMCNC: 31.2 G/DL (ref 28.4–34.8)
MCV RBC AUTO: 88.9 FL (ref 82.6–102.9)
MONOCYTES NFR BLD: 0.4 K/UL (ref 0.1–1.2)
MONOCYTES NFR BLD: 7 % (ref 3–12)
NEUTROPHILS NFR BLD: 64 % (ref 36–65)
NEUTS SEG NFR BLD: 3.91 K/UL (ref 1.5–8.1)
NRBC BLD-RTO: 0 PER 100 WBC
PLATELET # BLD AUTO: 239 K/UL (ref 138–453)
PMV BLD AUTO: 10.6 FL (ref 8.1–13.5)
POTASSIUM SERPL-SCNC: 4 MMOL/L (ref 3.7–5.3)
PROT SERPL-MCNC: 7.2 G/DL (ref 6.4–8.3)
RBC # BLD AUTO: 4.69 M/UL (ref 3.95–5.11)
RBC # BLD: ABNORMAL 10*6/UL
SODIUM SERPL-SCNC: 140 MMOL/L (ref 135–144)
TRIGL SERPL-MCNC: 88 MG/DL
TSH SERPL DL<=0.05 MIU/L-ACNC: 2.13 UIU/ML (ref 0.3–5)
WBC OTHER # BLD: 6 K/UL (ref 3.5–11.3)

## 2023-09-21 PROCEDURE — G8417 CALC BMI ABV UP PARAM F/U: HCPCS | Performed by: NURSE PRACTITIONER

## 2023-09-21 PROCEDURE — 1123F ACP DISCUSS/DSCN MKR DOCD: CPT | Performed by: NURSE PRACTITIONER

## 2023-09-21 PROCEDURE — 1090F PRES/ABSN URINE INCON ASSESS: CPT | Performed by: NURSE PRACTITIONER

## 2023-09-21 PROCEDURE — 36415 COLL VENOUS BLD VENIPUNCTURE: CPT | Performed by: NURSE PRACTITIONER

## 2023-09-21 PROCEDURE — G8400 PT W/DXA NO RESULTS DOC: HCPCS | Performed by: NURSE PRACTITIONER

## 2023-09-21 PROCEDURE — 99214 OFFICE O/P EST MOD 30 MIN: CPT | Performed by: NURSE PRACTITIONER

## 2023-09-21 PROCEDURE — G8427 DOCREV CUR MEDS BY ELIG CLIN: HCPCS | Performed by: NURSE PRACTITIONER

## 2023-09-21 PROCEDURE — 3078F DIAST BP <80 MM HG: CPT | Performed by: NURSE PRACTITIONER

## 2023-09-21 PROCEDURE — 3074F SYST BP LT 130 MM HG: CPT | Performed by: NURSE PRACTITIONER

## 2023-09-21 PROCEDURE — 1036F TOBACCO NON-USER: CPT | Performed by: NURSE PRACTITIONER

## 2023-09-21 ASSESSMENT — PATIENT HEALTH QUESTIONNAIRE - PHQ9
1. LITTLE INTEREST OR PLEASURE IN DOING THINGS: 0
SUM OF ALL RESPONSES TO PHQ QUESTIONS 1-9: 0
SUM OF ALL RESPONSES TO PHQ QUESTIONS 1-9: 0
2. FEELING DOWN, DEPRESSED OR HOPELESS: 0
SUM OF ALL RESPONSES TO PHQ9 QUESTIONS 1 & 2: 0
SUM OF ALL RESPONSES TO PHQ QUESTIONS 1-9: 0
SUM OF ALL RESPONSES TO PHQ QUESTIONS 1-9: 0

## 2023-09-21 ASSESSMENT — ENCOUNTER SYMPTOMS
SHORTNESS OF BREATH: 0
COUGH: 0
CHEST TIGHTNESS: 0
VOMITING: 0
ABDOMINAL PAIN: 0
EYE DISCHARGE: 0
DIARRHEA: 0
RHINORRHEA: 0
CONSTIPATION: 0

## 2023-11-05 DIAGNOSIS — M15.9 PRIMARY OSTEOARTHRITIS INVOLVING MULTIPLE JOINTS: ICD-10-CM

## 2023-11-06 RX ORDER — GABAPENTIN 100 MG/1
100 CAPSULE ORAL 2 TIMES DAILY
Qty: 180 CAPSULE | Refills: 1 | Status: SHIPPED | OUTPATIENT
Start: 2023-11-06 | End: 2024-05-04

## 2023-12-06 ENCOUNTER — OFFICE VISIT (OUTPATIENT)
Dept: FAMILY MEDICINE CLINIC | Age: 83
End: 2023-12-06
Payer: MEDICARE

## 2023-12-06 VITALS
OXYGEN SATURATION: 97 % | HEART RATE: 63 BPM | SYSTOLIC BLOOD PRESSURE: 126 MMHG | WEIGHT: 202 LBS | RESPIRATION RATE: 16 BRPM | TEMPERATURE: 97.2 F | DIASTOLIC BLOOD PRESSURE: 74 MMHG | BODY MASS INDEX: 34.67 KG/M2

## 2023-12-06 DIAGNOSIS — N18.32 CHRONIC KIDNEY DISEASE (CKD) STAGE G3B/A1, MODERATELY DECREASED GLOMERULAR FILTRATION RATE (GFR) BETWEEN 30-44 ML/MIN/1.73 SQUARE METER AND ALBUMINURIA CREATININE RATIO LESS THAN 30 MG/G (HCC): ICD-10-CM

## 2023-12-06 DIAGNOSIS — M15.9 PRIMARY OSTEOARTHRITIS INVOLVING MULTIPLE JOINTS: ICD-10-CM

## 2023-12-06 DIAGNOSIS — I10 PRIMARY HYPERTENSION: Primary | ICD-10-CM

## 2023-12-06 DIAGNOSIS — M79.89 LEG SWELLING: ICD-10-CM

## 2023-12-06 DIAGNOSIS — Z53.20 OSTEOPOROSIS SCREENING DECLINED: ICD-10-CM

## 2023-12-06 PROCEDURE — G8400 PT W/DXA NO RESULTS DOC: HCPCS | Performed by: NURSE PRACTITIONER

## 2023-12-06 PROCEDURE — 1036F TOBACCO NON-USER: CPT | Performed by: NURSE PRACTITIONER

## 2023-12-06 PROCEDURE — 99214 OFFICE O/P EST MOD 30 MIN: CPT | Performed by: NURSE PRACTITIONER

## 2023-12-06 PROCEDURE — 3078F DIAST BP <80 MM HG: CPT | Performed by: NURSE PRACTITIONER

## 2023-12-06 PROCEDURE — 3074F SYST BP LT 130 MM HG: CPT | Performed by: NURSE PRACTITIONER

## 2023-12-06 PROCEDURE — G8427 DOCREV CUR MEDS BY ELIG CLIN: HCPCS | Performed by: NURSE PRACTITIONER

## 2023-12-06 PROCEDURE — G8484 FLU IMMUNIZE NO ADMIN: HCPCS | Performed by: NURSE PRACTITIONER

## 2023-12-06 PROCEDURE — G8417 CALC BMI ABV UP PARAM F/U: HCPCS | Performed by: NURSE PRACTITIONER

## 2023-12-06 PROCEDURE — 1090F PRES/ABSN URINE INCON ASSESS: CPT | Performed by: NURSE PRACTITIONER

## 2023-12-06 PROCEDURE — 1123F ACP DISCUSS/DSCN MKR DOCD: CPT | Performed by: NURSE PRACTITIONER

## 2023-12-06 RX ORDER — ATORVASTATIN CALCIUM 40 MG/1
20 TABLET, FILM COATED ORAL DAILY
Qty: 45 TABLET | Refills: 3 | Status: SHIPPED | OUTPATIENT
Start: 2023-12-06 | End: 2024-11-30

## 2023-12-06 RX ORDER — GABAPENTIN 100 MG/1
100 CAPSULE ORAL 2 TIMES DAILY
Qty: 180 CAPSULE | Refills: 1 | Status: SHIPPED | OUTPATIENT
Start: 2023-12-06 | End: 2024-06-03

## 2023-12-06 RX ORDER — FUROSEMIDE 20 MG/1
TABLET ORAL
Qty: 90 TABLET | Refills: 3 | Status: SHIPPED | OUTPATIENT
Start: 2023-12-06

## 2023-12-06 ASSESSMENT — ENCOUNTER SYMPTOMS
CHEST TIGHTNESS: 0
VOMITING: 0
COUGH: 0
ABDOMINAL PAIN: 0
SHORTNESS OF BREATH: 0
RHINORRHEA: 0
CONSTIPATION: 0
DIARRHEA: 0
EYE DISCHARGE: 0

## 2023-12-06 NOTE — PROGRESS NOTES
[facility] [address], [address]. [facility] [address]  Dept: [phone]  Dept Fax: [phone]    Visit type: Established patient    Reason for Visit: Hypertension (3m f/u) and Health Maintenance (Vaccines /DEXA)         Assessment and Plan       1. Primary hypertension  2. Leg swelling  -     furosemide (LASIX) 20 MG tablet; TAKE 1 TABLET BY MOUTH DAILY AS  NEEDED FOR SWELLING, Disp-90 tablet, R-3Requesting 1 year supplyNormal  3. Primary osteoarthritis involving multiple joints  -     gabapentin (NEURONTIN) 100 MG capsule; Take 1 capsule by mouth in the morning and at bedtime for 180 days. , Disp-180 capsule, R-1Normal  4. Osteoporosis screening declined  5. Chronic kidney disease (CKD) stage G3b/A1, moderately decreased glomerular filtration rate (GFR) between 30-44 mL/min/1.73 square meter and albuminuria creatinine ratio less than 30 mg/g ([facility])    Is following with cardiology- BP controlled  Continue follow up with kidney specialist- CKD stable  OA controlled- continue exercise and using muscle relaxer as needed, and is also taking neurontin   Leg swelling controlled- can use compression stockings and lasix as needed   RSV, pneumonia, TDAP   Declines osteoporosis screening   Return in about 5 months (around 5/13/2024) for AWV. Subjective       Here for chronic issue follow up    OA   Onset over a year ago  Did try chiropractor and this helped  - is taking neurontin - is taking 3 times a day   Muscle relaxer as needed     Hx of HTN and HLD  Is following with Dr. Martha Rueda  No CP or palpitations  Also takes lasix as needed     Hx of CKD- does have a specialist that she follows        Review of Systems   Constitutional:  Negative for activity change, appetite change and fever. HENT:  Negative for congestion, ear pain and rhinorrhea. Eyes:  Negative for discharge and visual disturbance. Respiratory:  Negative for cough, chest tightness and shortness of breath.

## 2024-01-02 RX ORDER — ATORVASTATIN CALCIUM 40 MG/1
20 TABLET, FILM COATED ORAL DAILY
Qty: 45 TABLET | Refills: 3 | Status: SHIPPED | OUTPATIENT
Start: 2024-01-02

## 2024-01-11 ENCOUNTER — OFFICE VISIT (OUTPATIENT)
Dept: FAMILY MEDICINE CLINIC | Age: 84
End: 2024-01-11
Payer: MEDICARE

## 2024-01-11 VITALS
DIASTOLIC BLOOD PRESSURE: 78 MMHG | WEIGHT: 199 LBS | TEMPERATURE: 97.1 F | OXYGEN SATURATION: 97 % | HEART RATE: 73 BPM | RESPIRATION RATE: 16 BRPM | SYSTOLIC BLOOD PRESSURE: 132 MMHG | BODY MASS INDEX: 34.16 KG/M2

## 2024-01-11 DIAGNOSIS — I10 PRIMARY HYPERTENSION: ICD-10-CM

## 2024-01-11 DIAGNOSIS — M15.9 PRIMARY OSTEOARTHRITIS INVOLVING MULTIPLE JOINTS: ICD-10-CM

## 2024-01-11 DIAGNOSIS — M79.89 LEG SWELLING: Primary | ICD-10-CM

## 2024-01-11 DIAGNOSIS — N18.4 CHRONIC KIDNEY DISEASE, STAGE IV (SEVERE) (HCC): ICD-10-CM

## 2024-01-11 PROCEDURE — 3075F SYST BP GE 130 - 139MM HG: CPT | Performed by: NURSE PRACTITIONER

## 2024-01-11 PROCEDURE — G8427 DOCREV CUR MEDS BY ELIG CLIN: HCPCS | Performed by: NURSE PRACTITIONER

## 2024-01-11 PROCEDURE — 3078F DIAST BP <80 MM HG: CPT | Performed by: NURSE PRACTITIONER

## 2024-01-11 PROCEDURE — 1090F PRES/ABSN URINE INCON ASSESS: CPT | Performed by: NURSE PRACTITIONER

## 2024-01-11 PROCEDURE — G8484 FLU IMMUNIZE NO ADMIN: HCPCS | Performed by: NURSE PRACTITIONER

## 2024-01-11 PROCEDURE — G8400 PT W/DXA NO RESULTS DOC: HCPCS | Performed by: NURSE PRACTITIONER

## 2024-01-11 PROCEDURE — G8417 CALC BMI ABV UP PARAM F/U: HCPCS | Performed by: NURSE PRACTITIONER

## 2024-01-11 PROCEDURE — 1036F TOBACCO NON-USER: CPT | Performed by: NURSE PRACTITIONER

## 2024-01-11 PROCEDURE — 99214 OFFICE O/P EST MOD 30 MIN: CPT | Performed by: NURSE PRACTITIONER

## 2024-01-11 PROCEDURE — 1123F ACP DISCUSS/DSCN MKR DOCD: CPT | Performed by: NURSE PRACTITIONER

## 2024-01-11 RX ORDER — METHYLPREDNISOLONE 4 MG/1
4 TABLET ORAL
COMMUNITY
Start: 2024-01-02 | End: 2024-01-11

## 2024-01-11 ASSESSMENT — PATIENT HEALTH QUESTIONNAIRE - PHQ9
SUM OF ALL RESPONSES TO PHQ QUESTIONS 1-9: 0
SUM OF ALL RESPONSES TO PHQ QUESTIONS 1-9: 0
2. FEELING DOWN, DEPRESSED OR HOPELESS: 0
1. LITTLE INTEREST OR PLEASURE IN DOING THINGS: 0
SUM OF ALL RESPONSES TO PHQ QUESTIONS 1-9: 0
SUM OF ALL RESPONSES TO PHQ9 QUESTIONS 1 & 2: 0
SUM OF ALL RESPONSES TO PHQ QUESTIONS 1-9: 0

## 2024-01-11 ASSESSMENT — ENCOUNTER SYMPTOMS
COUGH: 0
RHINORRHEA: 0
ABDOMINAL PAIN: 0
VOMITING: 0
DIARRHEA: 0
SHORTNESS OF BREATH: 0
CHEST TIGHTNESS: 0
EYE DISCHARGE: 0
CONSTIPATION: 0

## 2024-01-11 NOTE — PROGRESS NOTES
.   [facility]  [address]  [place] [address]  Dept: [phone]  Dept Fax: [phone]    Visit type: Established patient    Reason for Visit: Hip Pain (Currently following [facility] for this /L hip ) and Hypertension (Follow up )         Assessment and Plan       1. Leg swelling  2. Chronic kidney disease, stage IV (severe) ([facility])  3. Primary osteoarthritis involving multiple joints  4. Primary hypertension    Continue benazepril and lasix as needed  Continue gabapentin  Is also following with cardiology- discussed AHA diet, continue monitor BP at home - discussed how recent pain, URI and steroid can cause some fluctuation in BP measurements   If consistently getting above 140/90 can be re eval before follow up visit   Return if symptoms worsen or fail to improve.       Subjective       Here for chronic issue follow up    OA   Onset over a year ago  Did try chiropractor and this helped  - is taking neurontin - is taking 3 times a day   Muscle relaxer as needed   Update 1/11/2024. States that she went to [facility] because of left hip pain and was told she had arthritis and had steroid pack. Is no longer needing to walk with cane     Hx of HTN and HLD  Is following with Dr. Badillo  No CP or palpitations  Also takes lasix as needed   Update 1/11/2024 Since being on the steroid and her hip pain her BP started to become elevated at home. No CP or palpitations. Systolic 107-130 and diastolic 71-97. Did have some leg swelling and she only took this for three days and it helped. Also noted that around lonnie she had a cold in which she had been taking OTC cough medications     Hx of CKD- does have a specialist that she follows          Review of Systems   Constitutional:  Negative for activity change, appetite change and fever.   HENT:  Negative for congestion, ear pain and rhinorrhea.    Eyes:  Negative for discharge and visual disturbance.   Respiratory:  Negative for cough, chest

## 2024-03-28 ENCOUNTER — TELEPHONE (OUTPATIENT)
Dept: FAMILY MEDICINE CLINIC | Age: 84
End: 2024-03-28

## 2024-03-28 DIAGNOSIS — N18.4 CHRONIC KIDNEY DISEASE, STAGE IV (SEVERE) (HCC): Primary | ICD-10-CM

## 2024-03-28 NOTE — TELEPHONE ENCOUNTER
----- Message from Glenda Coombs sent at 3/28/2024 10:08 AM EDT -----  Subject: Referral Request    Reason for referral request? blood work before appt on 5/13/2024  Provider patient wants to be referred to(if known):     Provider Phone Number(if known):    Additional Information for Provider?   ---------------------------------------------------------------------------  --------------  CALL BACK INFO    [phone]; OK to leave message on voicemail  ---------------------------------------------------------------------------  --------------

## 2024-03-28 NOTE — TELEPHONE ENCOUNTER
Labs ordered for her to complete prior to her visit.  Please schedule lab visit 1 week prior thank you.

## 2024-05-06 ENCOUNTER — NURSE ONLY (OUTPATIENT)
Dept: FAMILY MEDICINE CLINIC | Age: 84
End: 2024-05-06
Payer: MEDICARE

## 2024-05-06 DIAGNOSIS — N18.32 CHRONIC KIDNEY DISEASE (CKD) STAGE G3B/A1, MODERATELY DECREASED GLOMERULAR FILTRATION RATE (GFR) BETWEEN 30-44 ML/MIN/1.73 SQUARE METER AND ALBUMINURIA CREATININE RATIO LESS THAN 30 MG/G (HCC): Primary | ICD-10-CM

## 2024-05-06 DIAGNOSIS — I10 ESSENTIAL HYPERTENSION, MALIGNANT: ICD-10-CM

## 2024-05-06 DIAGNOSIS — N18.32 CHRONIC KIDNEY DISEASE (CKD) STAGE G3B/A1, MODERATELY DECREASED GLOMERULAR FILTRATION RATE (GFR) BETWEEN 30-44 ML/MIN/1.73 SQUARE METER AND ALBUMINURIA CREATININE RATIO LESS THAN 30 MG/G (HCC): ICD-10-CM

## 2024-05-06 LAB
ALBUMIN SERPL BCG-MCNC: 4.1 G/DL (ref 3.5–5.1)
ALP SERPL-CCNC: 64 U/L (ref 38–126)
ALT SERPL W/O P-5'-P-CCNC: 20 U/L (ref 11–66)
ANION GAP SERPL CALC-SCNC: 13 MEQ/L (ref 8–16)
AST SERPL-CCNC: 23 U/L (ref 5–40)
BASOPHILS ABSOLUTE: 0.1 THOU/MM3 (ref 0–0.1)
BASOPHILS NFR BLD AUTO: 1.1 %
BILIRUB SERPL-MCNC: 0.3 MG/DL (ref 0.3–1.2)
BUN SERPL-MCNC: 26 MG/DL (ref 7–22)
CALCIUM SERPL-MCNC: 9.5 MG/DL (ref 8.5–10.5)
CHLORIDE SERPL-SCNC: 107 MEQ/L (ref 98–111)
CHOLEST SERPL-MCNC: 174 MG/DL (ref 100–199)
CO2 SERPL-SCNC: 25 MEQ/L (ref 23–33)
CREAT SERPL-MCNC: 1.4 MG/DL (ref 0.4–1.2)
DEPRECATED RDW RBC AUTO: 45.6 FL (ref 35–45)
EOSINOPHIL NFR BLD AUTO: 7.5 %
EOSINOPHILS ABSOLUTE: 0.4 THOU/MM3 (ref 0–0.4)
ERYTHROCYTE [DISTWIDTH] IN BLOOD BY AUTOMATED COUNT: 13.5 % (ref 11.5–14.5)
GFR SERPL CREATININE-BSD FRML MDRD: 37 ML/MIN/1.73M2
GLUCOSE SERPL-MCNC: 101 MG/DL (ref 70–108)
HCT VFR BLD AUTO: 40.8 % (ref 37–47)
HDLC SERPL-MCNC: 51 MG/DL
HGB BLD-MCNC: 13.4 GM/DL (ref 12–16)
IMM GRANULOCYTES # BLD AUTO: 0.03 THOU/MM3 (ref 0–0.07)
IMM GRANULOCYTES NFR BLD AUTO: 0.5 %
LDLC SERPL CALC-MCNC: 103 MG/DL
LYMPHOCYTES ABSOLUTE: 1.3 THOU/MM3 (ref 1–4.8)
LYMPHOCYTES NFR BLD AUTO: 22.7 %
MAGNESIUM SERPL-MCNC: 2.2 MG/DL (ref 1.6–2.4)
MCH RBC QN AUTO: 30.2 PG (ref 26–33)
MCHC RBC AUTO-ENTMCNC: 32.8 GM/DL (ref 32.2–35.5)
MCV RBC AUTO: 91.9 FL (ref 81–99)
MONOCYTES ABSOLUTE: 0.4 THOU/MM3 (ref 0.4–1.3)
MONOCYTES NFR BLD AUTO: 6.9 %
NEUTROPHILS ABSOLUTE: 3.4 THOU/MM3 (ref 1.8–7.7)
NEUTROPHILS NFR BLD AUTO: 61.3 %
NRBC BLD AUTO-RTO: 0 /100 WBC
PLATELET # BLD AUTO: 242 THOU/MM3 (ref 130–400)
PMV BLD AUTO: 10.4 FL (ref 9.4–12.4)
POTASSIUM SERPL-SCNC: 4.6 MEQ/L (ref 3.5–5.2)
PROT SERPL-MCNC: 7.2 G/DL (ref 6.1–8)
RBC # BLD AUTO: 4.44 MILL/MM3 (ref 4.2–5.4)
SODIUM SERPL-SCNC: 145 MEQ/L (ref 135–145)
TRIGL SERPL-MCNC: 98 MG/DL (ref 0–199)
TSH SERPL DL<=0.005 MIU/L-ACNC: 2.83 UIU/ML (ref 0.4–4.2)
WBC # BLD AUTO: 5.6 THOU/MM3 (ref 4.8–10.8)

## 2024-05-06 PROCEDURE — 36415 COLL VENOUS BLD VENIPUNCTURE: CPT | Performed by: NURSE PRACTITIONER

## 2024-05-06 RX ORDER — BENAZEPRIL HYDROCHLORIDE 10 MG/1
10 TABLET ORAL DAILY
Qty: 90 TABLET | Refills: 3 | OUTPATIENT
Start: 2024-05-06

## 2024-05-06 NOTE — PROGRESS NOTES
Venipuncture obtained from  right arm. Patient tolerated the procedure without complications or complaints.    1 pst 1 lvv

## 2024-05-10 NOTE — PROGRESS NOTES
Health Maintenance Due   Topic Date Due    DTaP/Tdap/Td vaccine (1 - Tdap) Never done    Respiratory Syncytial Virus (RSV) Pregnant or age 60 yrs+ (1 - 1-dose 60+ series) Never done    Pneumococcal 65+ years Vaccine (1 of 1 - PCV) Never done    COVID-19 Vaccine (4 - 2023-24 season) 09/01/2023    Annual Wellness Visit (Medicare)  05/10/2024

## 2024-05-13 ENCOUNTER — OFFICE VISIT (OUTPATIENT)
Dept: FAMILY MEDICINE CLINIC | Age: 84
End: 2024-05-13
Payer: MEDICARE

## 2024-05-13 VITALS
TEMPERATURE: 97.2 F | DIASTOLIC BLOOD PRESSURE: 72 MMHG | HEART RATE: 65 BPM | BODY MASS INDEX: 35.41 KG/M2 | HEIGHT: 64 IN | SYSTOLIC BLOOD PRESSURE: 138 MMHG | OXYGEN SATURATION: 98 % | RESPIRATION RATE: 18 BRPM | WEIGHT: 207.4 LBS

## 2024-05-13 DIAGNOSIS — Z00.00 MEDICARE ANNUAL WELLNESS VISIT, SUBSEQUENT: Primary | ICD-10-CM

## 2024-05-13 DIAGNOSIS — M19.09 PRIMARY OSTEOARTHRITIS OF OTHER SITE: ICD-10-CM

## 2024-05-13 DIAGNOSIS — E66.01 SEVERE OBESITY (BMI 35.0-39.9) WITH COMORBIDITY (HCC): ICD-10-CM

## 2024-05-13 PROCEDURE — 3078F DIAST BP <80 MM HG: CPT | Performed by: NURSE PRACTITIONER

## 2024-05-13 PROCEDURE — G0439 PPPS, SUBSEQ VISIT: HCPCS | Performed by: NURSE PRACTITIONER

## 2024-05-13 PROCEDURE — 1123F ACP DISCUSS/DSCN MKR DOCD: CPT | Performed by: NURSE PRACTITIONER

## 2024-05-13 PROCEDURE — 3075F SYST BP GE 130 - 139MM HG: CPT | Performed by: NURSE PRACTITIONER

## 2024-05-13 RX ORDER — ATORVASTATIN CALCIUM 40 MG/1
40 TABLET, FILM COATED ORAL DAILY
Qty: 90 TABLET | Refills: 3 | Status: SHIPPED | OUTPATIENT
Start: 2024-05-13

## 2024-05-13 RX ORDER — BENAZEPRIL HYDROCHLORIDE 10 MG/1
TABLET ORAL
COMMUNITY
Start: 2024-03-11

## 2024-05-13 SDOH — ECONOMIC STABILITY: HOUSING INSECURITY
IN THE LAST 12 MONTHS, WAS THERE A TIME WHEN YOU DID NOT HAVE A STEADY PLACE TO SLEEP OR SLEPT IN A SHELTER (INCLUDING NOW)?: NO

## 2024-05-13 SDOH — ECONOMIC STABILITY: FOOD INSECURITY: WITHIN THE PAST 12 MONTHS, YOU WORRIED THAT YOUR FOOD WOULD RUN OUT BEFORE YOU GOT MONEY TO BUY MORE.: NEVER TRUE

## 2024-05-13 SDOH — ECONOMIC STABILITY: FOOD INSECURITY: WITHIN THE PAST 12 MONTHS, THE FOOD YOU BOUGHT JUST DIDN'T LAST AND YOU DIDN'T HAVE MONEY TO GET MORE.: NEVER TRUE

## 2024-05-13 SDOH — ECONOMIC STABILITY: INCOME INSECURITY: HOW HARD IS IT FOR YOU TO PAY FOR THE VERY BASICS LIKE FOOD, HOUSING, MEDICAL CARE, AND HEATING?: NOT HARD AT ALL

## 2024-05-13 ASSESSMENT — PATIENT HEALTH QUESTIONNAIRE - PHQ9
SUM OF ALL RESPONSES TO PHQ QUESTIONS 1-9: 0
SUM OF ALL RESPONSES TO PHQ QUESTIONS 1-9: 0
2. FEELING DOWN, DEPRESSED OR HOPELESS: NOT AT ALL
SUM OF ALL RESPONSES TO PHQ9 QUESTIONS 1 & 2: 0
SUM OF ALL RESPONSES TO PHQ QUESTIONS 1-9: 0
SUM OF ALL RESPONSES TO PHQ QUESTIONS 1-9: 0
1. LITTLE INTEREST OR PLEASURE IN DOING THINGS: NOT AT ALL

## 2024-05-13 ASSESSMENT — LIFESTYLE VARIABLES
HOW MANY STANDARD DRINKS CONTAINING ALCOHOL DO YOU HAVE ON A TYPICAL DAY: PATIENT DOES NOT DRINK
HOW OFTEN DO YOU HAVE A DRINK CONTAINING ALCOHOL: NEVER

## 2024-05-13 NOTE — PROGRESS NOTES
Medicare Annual Wellness Visit    Anisha Fitzpatrick is here for Medicare AWV (Medicare annual ) and Health Maintenance (See note )    Assessment & Plan   Medicare annual wellness visit, subsequent  Primary osteoarthritis of other site  -     Handicap Placard MISC; Starting Mon 5/13/2024, Disp-1 each, R-0, PrintExpires 5 year from date of prescription  Severe obesity (BMI 35.0-39.9) with comorbidity (HCC)  -     Handicap Placard MISC; Starting Mon 5/13/2024, Disp-1 each, R-0, PrintExpires 5 year from date of prescription  Declines pneumonia vaccine, RSV, TDAP and covid booster - if she changes her mind she will go to [facility]   [facility] for hx of [facility]   Recommendations for Preventive Services Due: see orders and patient instructions/AVS.  Recommended screening schedule for the next 5-10 years is provided to the patient in written form: see Patient Instructions/AVS.     Return in about 6 months (around 11/13/2024) for chronic issues .     Subjective     OA- is taking neurontin for pain and this helps     Rare leg swelling- mainly when she eats out and will take lasix as needed   Patient's complete Health Risk Assessment and screening values have been reviewed and are found in Flowsheets. The following problems were reviewed today and where indicated follow up appointments were made and/or referrals ordered.    Positive Risk Factor Screenings with Interventions:       Cognitive:   Clock Drawing Test (CDT): (!) Abnormal  Words recalled: 3 Words Recalled  Total Score: 3  Total Score Interpretation: Normal Mini-Cog  Interventions:  Patient declines any further evaluation or treatment            Activity, Diet, and Weight:  On average, how many days per week do you engage in moderate to strenuous exercise (like a brisk walk)?: 3 days  On average, how many minutes do you engage in exercise at this level?: 40 min    Do you eat balanced/healthy meals regularly?: Yes    Body mass index is 35.19 kg/m². (!)

## 2024-06-03 DIAGNOSIS — M15.9 PRIMARY OSTEOARTHRITIS INVOLVING MULTIPLE JOINTS: ICD-10-CM

## 2024-06-03 RX ORDER — GABAPENTIN 100 MG/1
100 CAPSULE ORAL 2 TIMES DAILY
Qty: 180 CAPSULE | Refills: 0 | Status: SHIPPED | OUTPATIENT
Start: 2024-06-03 | End: 2024-09-01

## 2024-07-10 ENCOUNTER — OFFICE VISIT (OUTPATIENT)
Dept: CARDIOLOGY CLINIC | Age: 84
End: 2024-07-10
Payer: MEDICARE

## 2024-07-10 VITALS
DIASTOLIC BLOOD PRESSURE: 84 MMHG | BODY MASS INDEX: 34.26 KG/M2 | HEIGHT: 65 IN | HEART RATE: 63 BPM | WEIGHT: 205.6 LBS | SYSTOLIC BLOOD PRESSURE: 162 MMHG

## 2024-07-10 DIAGNOSIS — E78.01 FAMILIAL HYPERCHOLESTEROLEMIA: ICD-10-CM

## 2024-07-10 DIAGNOSIS — R06.02 SOB (SHORTNESS OF BREATH) ON EXERTION: Primary | ICD-10-CM

## 2024-07-10 DIAGNOSIS — I25.10 CORONARY ARTERY DISEASE INVOLVING NATIVE CORONARY ARTERY OF NATIVE HEART WITHOUT ANGINA PECTORIS: ICD-10-CM

## 2024-07-10 DIAGNOSIS — R06.02 SHORTNESS OF BREATH: ICD-10-CM

## 2024-07-10 DIAGNOSIS — I10 PRIMARY HYPERTENSION: ICD-10-CM

## 2024-07-10 PROCEDURE — G8417 CALC BMI ABV UP PARAM F/U: HCPCS | Performed by: NUCLEAR MEDICINE

## 2024-07-10 PROCEDURE — G8427 DOCREV CUR MEDS BY ELIG CLIN: HCPCS | Performed by: NUCLEAR MEDICINE

## 2024-07-10 PROCEDURE — G8400 PT W/DXA NO RESULTS DOC: HCPCS | Performed by: NUCLEAR MEDICINE

## 2024-07-10 PROCEDURE — 3079F DIAST BP 80-89 MM HG: CPT | Performed by: NUCLEAR MEDICINE

## 2024-07-10 PROCEDURE — 1036F TOBACCO NON-USER: CPT | Performed by: NUCLEAR MEDICINE

## 2024-07-10 PROCEDURE — 3077F SYST BP >= 140 MM HG: CPT | Performed by: NUCLEAR MEDICINE

## 2024-07-10 PROCEDURE — 93000 ELECTROCARDIOGRAM COMPLETE: CPT | Performed by: NUCLEAR MEDICINE

## 2024-07-10 PROCEDURE — 1123F ACP DISCUSS/DSCN MKR DOCD: CPT | Performed by: NUCLEAR MEDICINE

## 2024-07-10 PROCEDURE — 99214 OFFICE O/P EST MOD 30 MIN: CPT | Performed by: NUCLEAR MEDICINE

## 2024-07-10 PROCEDURE — 1090F PRES/ABSN URINE INCON ASSESS: CPT | Performed by: NUCLEAR MEDICINE

## 2024-07-10 NOTE — PROGRESS NOTES
[facility] [place] [facility]  [facility]  [address]  [address]  [facility] [address]  Dept: [phone]  Dept Fax: [phone]  Loc: [phone]    Visit Date: 7/10/2024    Anisha Fitzpatrick is a 83 y.o. female who presents todayfor:  Chief Complaint   Patient presents with    1 Year Follow Up    Shortness of Breath     With bending over     Edema     In bilateral ankles.     Valvular Heart Disease    Hypertension    Coronary Artery Disease     Some more dyspnea  Exertional   Known AS   Mild to moderate   No chest ain per se  Known moderate CAD  BP is higher lately   No dizziness  No syncope  On statins for hyperlipidemia  No issues with the meds     HPI:  HPI  Past Medical History:   Diagnosis Date    Chronic kidney disease, stage IV (severe) (HCC)     Osteoarthritis     Other and unspecified hyperlipidemia     Unspecified essential hypertension       Past Surgical History:   Procedure Laterality Date    ELBOW SURGERY      SHOULDER SURGERY  2016    TUMOR REMOVAL       Family History   Problem Relation Age of Onset    Atrial Fibrillation Mother      Social History     Tobacco Use    Smoking status: Never    Smokeless tobacco: Never   Substance Use Topics    Alcohol use: No      Current Outpatient Medications   Medication Sig Dispense Refill    NONFORMULARY Omega XL Joint and Muscle Relief      gabapentin (NEURONTIN) 100 MG capsule Take 1 capsule by mouth 2 times daily for 90 days. 180 capsule 0    benazepril (LOTENSIN) 10 MG tablet       atorvastatin (LIPITOR) 40 MG tablet Take 1 tablet by mouth daily 90 tablet 3    Handicap Placard MISC by Does not apply route Expires 5 year from date of prescription 1 each 0    furosemide (LASIX) 20 MG tablet TAKE 1 TABLET BY MOUTH DAILY AS  NEEDED FOR SWELLING 90 tablet 3    amLODIPine-benazepril (LOTREL) 5-10 MG per capsule Take by mouth      Cholecalciferol (VITAMIN D-3 PO) Take by mouth daily      Cyanocobalamin (VITAMIN B 12 PO) Take by mouth

## 2024-07-30 ENCOUNTER — LAB (OUTPATIENT)
Dept: FAMILY MEDICINE CLINIC | Age: 84
End: 2024-07-30
Payer: MEDICARE

## 2024-07-30 DIAGNOSIS — I10 ESSENTIAL HYPERTENSION, MALIGNANT: ICD-10-CM

## 2024-07-30 DIAGNOSIS — N18.32 CHRONIC KIDNEY DISEASE (CKD) STAGE G3B/A1, MODERATELY DECREASED GLOMERULAR FILTRATION RATE (GFR) BETWEEN 30-44 ML/MIN/1.73 SQUARE METER AND ALBUMINURIA CREATININE RATIO LESS THAN 30 MG/G (HCC): Primary | ICD-10-CM

## 2024-07-30 LAB
25(OH)D3 SERPL-MCNC: 76 NG/ML (ref 30–100)
ANION GAP SERPL CALC-SCNC: 14 MEQ/L (ref 8–16)
BASOPHILS ABSOLUTE: 0.1 THOU/MM3 (ref 0–0.1)
BASOPHILS NFR BLD AUTO: 0.9 %
BUN SERPL-MCNC: 19 MG/DL (ref 7–22)
CALCIUM SERPL-MCNC: 9.3 MG/DL (ref 8.5–10.5)
CHLORIDE SERPL-SCNC: 106 MEQ/L (ref 98–111)
CO2 SERPL-SCNC: 23 MEQ/L (ref 23–33)
CREAT SERPL-MCNC: 1.4 MG/DL (ref 0.4–1.2)
DEPRECATED RDW RBC AUTO: 45.3 FL (ref 35–45)
EOSINOPHIL NFR BLD AUTO: 8.1 %
EOSINOPHILS ABSOLUTE: 0.5 THOU/MM3 (ref 0–0.4)
ERYTHROCYTE [DISTWIDTH] IN BLOOD BY AUTOMATED COUNT: 13.5 % (ref 11.5–14.5)
GFR SERPL CREATININE-BSD FRML MDRD: 37 ML/MIN/1.73M2
GLUCOSE SERPL-MCNC: 102 MG/DL (ref 70–108)
HCT VFR BLD AUTO: 39.4 % (ref 37–47)
HGB BLD-MCNC: 12.7 GM/DL (ref 12–16)
IMM GRANULOCYTES # BLD AUTO: 0.02 THOU/MM3 (ref 0–0.07)
IMM GRANULOCYTES NFR BLD AUTO: 0.3 %
LYMPHOCYTES ABSOLUTE: 1.4 THOU/MM3 (ref 1–4.8)
LYMPHOCYTES NFR BLD AUTO: 24.4 %
MAGNESIUM SERPL-MCNC: 2.3 MG/DL (ref 1.6–2.4)
MCH RBC QN AUTO: 29.5 PG (ref 26–33)
MCHC RBC AUTO-ENTMCNC: 32.2 GM/DL (ref 32.2–35.5)
MCV RBC AUTO: 91.6 FL (ref 81–99)
MONOCYTES ABSOLUTE: 0.5 THOU/MM3 (ref 0.4–1.3)
MONOCYTES NFR BLD AUTO: 8.2 %
NEUTROPHILS ABSOLUTE: 3.4 THOU/MM3 (ref 1.8–7.7)
NEUTROPHILS NFR BLD AUTO: 58.1 %
NRBC BLD AUTO-RTO: 0 /100 WBC
PHOSPHATE SERPL-MCNC: 3.9 MG/DL (ref 2.4–4.7)
PLATELET # BLD AUTO: 243 THOU/MM3 (ref 130–400)
PMV BLD AUTO: 10.4 FL (ref 9.4–12.4)
POTASSIUM SERPL-SCNC: 4.5 MEQ/L (ref 3.5–5.2)
PTH-INTACT SERPL-MCNC: 19.4 PG/ML (ref 15–65)
RBC # BLD AUTO: 4.3 MILL/MM3 (ref 4.2–5.4)
SODIUM SERPL-SCNC: 143 MEQ/L (ref 135–145)
WBC # BLD AUTO: 5.8 THOU/MM3 (ref 4.8–10.8)

## 2024-07-30 PROCEDURE — 36415 COLL VENOUS BLD VENIPUNCTURE: CPT | Performed by: NURSE PRACTITIONER

## 2024-08-07 ENCOUNTER — HOSPITAL ENCOUNTER (OUTPATIENT)
Age: 84
Discharge: HOME | End: 2024-08-09
Attending: NUCLEAR MEDICINE
Payer: MEDICARE

## 2024-08-07 ENCOUNTER — HOSPITAL ENCOUNTER (OUTPATIENT)
Dept: NUCLEAR MEDICINE | Age: 84
Discharge: HOME | End: 2024-08-07
Attending: NUCLEAR MEDICINE
Payer: MEDICARE

## 2024-08-07 VITALS
SYSTOLIC BLOOD PRESSURE: 162 MMHG | DIASTOLIC BLOOD PRESSURE: 84 MMHG | HEIGHT: 65 IN | BODY MASS INDEX: 34.16 KG/M2 | WEIGHT: 205.03 LBS

## 2024-08-07 DIAGNOSIS — R06.02 SHORTNESS OF BREATH: ICD-10-CM

## 2024-08-07 DIAGNOSIS — R06.02 SOB (SHORTNESS OF BREATH) ON EXERTION: ICD-10-CM

## 2024-08-07 DIAGNOSIS — I25.10 CORONARY ARTERY DISEASE INVOLVING NATIVE CORONARY ARTERY OF NATIVE HEART WITHOUT ANGINA PECTORIS: ICD-10-CM

## 2024-08-07 DIAGNOSIS — E78.01 FAMILIAL HYPERCHOLESTEROLEMIA: ICD-10-CM

## 2024-08-07 DIAGNOSIS — I10 PRIMARY HYPERTENSION: ICD-10-CM

## 2024-08-07 LAB
ECHO AO ASC DIAM: 3.6 CM
ECHO AO ASCENDING AORTA INDEX: 1.81 CM/M2
ECHO AO SINUS VALSALVA DIAM: 3.2 CM
ECHO AO SINUS VALSALVA INDEX: 1.61 CM/M2
ECHO AO ST JNCT DIAM: 2.5 CM
ECHO AR MAX VEL PISA: 4.3 M/S
ECHO AV AREA PEAK VELOCITY: 1.6 CM2
ECHO AV AREA VTI: 1.6 CM2
ECHO AV AREA/BSA PEAK VELOCITY: 0.8 CM2/M2
ECHO AV AREA/BSA VTI: 0.8 CM2/M2
ECHO AV CUSP MM: 1.4 CM
ECHO AV MEAN GRADIENT: 11 MMHG
ECHO AV MEAN VELOCITY: 1.6 M/S
ECHO AV PEAK GRADIENT: 20 MMHG
ECHO AV PEAK VELOCITY: 2.2 M/S
ECHO AV REGURGITANT PHT: 747 MS
ECHO AV VELOCITY RATIO: 0.59
ECHO AV VTI: 52.6 CM
ECHO BSA: 2.06 M2
ECHO BSA: 2.06 M2
ECHO EST RA PRESSURE: 5 MMHG
ECHO LA AREA 2C: 19 CM2
ECHO LA AREA 4C: 20.6 CM2
ECHO LA DIAMETER INDEX: 1.21 CM/M2
ECHO LA MAJOR AXIS: 6 CM
ECHO LA MINOR AXIS: 6.1 CM
ECHO LA VOL BP: 52 ML (ref 22–52)
ECHO LA VOL MOD A2C: 49 ML (ref 22–52)
ECHO LA VOL MOD A4C: 55 ML (ref 22–52)
ECHO LA VOL/BSA BIPLANE: 26 ML/M2 (ref 16–34)
ECHO LA VOLUME INDEX MOD A2C: 25 ML/M2 (ref 16–34)
ECHO LA VOLUME INDEX MOD A4C: 28 ML/M2 (ref 16–34)
ECHO LV E' LATERAL VELOCITY: 11 CM/S
ECHO LV E' SEPTAL VELOCITY: 8 CM/S
ECHO LV FRACTIONAL SHORTENING: 35 % (ref 28–44)
ECHO LV INTERNAL DIMENSION DIASTOLE INDEX: 1.86 CM/M2
ECHO LV INTERNAL DIMENSION SYSTOLIC INDEX: 1.21 CM/M2
ECHO LV INTERNAL DIMENSION SYSTOLIC: 2.4 CM
ECHO LV ISOVOLUMETRIC RELAXATION TIME (IVRT): 85 MS
ECHO LV IVSD: 1.3 CM (ref 0.6–0.9)
ECHO LV MASS 2D: 156.7 G (ref 67–162)
ECHO LV MASS INDEX 2D: 78.8 G/M2 (ref 43–95)
ECHO LV POSTERIOR WALL DIASTOLIC: 1.2 CM (ref 0.6–0.9)
ECHO LV RELATIVE WALL THICKNESS RATIO: 0.65
ECHO LVOT AREA: 2.8 CM2
ECHO LVOT AV VTI INDEX: 0.56
ECHO LVOT DIAM: 1.9 CM
ECHO LVOT MEAN GRADIENT: 3 MMHG
ECHO LVOT PEAK GRADIENT: 6 MMHG
ECHO LVOT PEAK VELOCITY: 1.3 M/S
ECHO LVOT STROKE VOLUME INDEX: 41.9 ML/M2
ECHO LVOT SV: 83.3 ML
ECHO LVOT VTI: 29.4 CM
ECHO MV A VELOCITY: 1.23 M/S
ECHO MV E DECELERATION TIME (DT): 280 MS
ECHO MV E VELOCITY: 0.8 M/S
ECHO MV E/A RATIO: 0.65
ECHO MV E/E' LATERAL: 7.27
ECHO MV E/E' RATIO (AVERAGED): 8.64
ECHO MV E/E' SEPTAL: 10
ECHO MV REGURGITANT PEAK GRADIENT: 77 MMHG
ECHO MV REGURGITANT PEAK VELOCITY: 4.4 M/S
ECHO PULMONARY ARTERY END DIASTOLIC PRESSURE: 4 MMHG
ECHO PV MAX VELOCITY: 0.7 M/S
ECHO PV PEAK GRADIENT: 2 MMHG
ECHO PV REGURGITANT MAX VELOCITY: 1.1 M/S
ECHO RIGHT VENTRICULAR SYSTOLIC PRESSURE (RVSP): 28 MMHG
ECHO RV FREE WALL PEAK S': 11 CM/S
ECHO RV INTERNAL DIMENSION: 2.9 CM
ECHO RV TAPSE: 2 CM (ref 1.7–?)
ECHO TV E WAVE: 0.6 M/S
ECHO TV REGURGITANT MAX VELOCITY: 2.39 M/S
ECHO TV REGURGITANT PEAK GRADIENT: 23 MMHG
NUC STRESS EJECTION FRACTION: 57 %
STRESS BASELINE DIAS BP: 65 MMHG
STRESS BASELINE HR: 54 BPM
STRESS BASELINE SYS BP: 145 MMHG
STRESS PEAK DIAS BP: 65 MMHG
STRESS PEAK SYS BP: 145 MMHG
STRESS PERCENT HR ACHIEVED: 64 %
STRESS POST PEAK HR: 88 BPM
STRESS RATE PRESSURE PRODUCT: NORMAL BPM*MMHG
STRESS STAGE 1 BP: NORMAL MMHG
STRESS STAGE 1 DURATION: 1 MIN:SEC
STRESS STAGE 1 HR: 68 BPM
STRESS STAGE 2 BP: NORMAL MMHG
STRESS STAGE 2 DURATION: 1 MIN:SEC
STRESS STAGE 2 HR: 88 BPM
STRESS STAGE 3 BP: NORMAL MMHG
STRESS STAGE 3 DURATION: 1 MIN:SEC
STRESS STAGE 3 HR: 76 BPM
STRESS STAGE RECOVERY 1 BP: NORMAL MMHG
STRESS STAGE RECOVERY 1 DURATION: 1 MIN:SEC
STRESS STAGE RECOVERY 1 HR: 72 BPM
STRESS STAGE RECOVERY 2 BP: NORMAL MMHG
STRESS STAGE RECOVERY 2 DURATION: 1 MIN:SEC
STRESS STAGE RECOVERY 2 HR: 79 BPM
STRESS STAGE RECOVERY 3 BP: NORMAL MMHG
STRESS STAGE RECOVERY 3 DURATION: 1 MIN:SEC
STRESS STAGE RECOVERY 3 HR: 68 BPM
STRESS STAGE RECOVERY 4 BP: NORMAL MMHG
STRESS STAGE RECOVERY 4 DURATION: 1 MIN:SEC
STRESS STAGE RECOVERY 4 HR: 65 BPM
STRESS TARGET HR: 138 BPM

## 2024-08-07 PROCEDURE — 93306 TTE W/DOPPLER COMPLETE: CPT

## 2024-08-07 PROCEDURE — 93306 TTE W/DOPPLER COMPLETE: CPT | Performed by: NUCLEAR MEDICINE

## 2024-08-07 PROCEDURE — A9500 TC99M SESTAMIBI: HCPCS | Performed by: NUCLEAR MEDICINE

## 2024-08-07 PROCEDURE — 93017 CV STRESS TEST TRACING ONLY: CPT

## 2024-08-07 PROCEDURE — 3430000000 HC RX DIAGNOSTIC RADIOPHARMACEUTICAL: Performed by: NUCLEAR MEDICINE

## 2024-08-07 PROCEDURE — 78452 HT MUSCLE IMAGE SPECT MULT: CPT

## 2024-08-07 PROCEDURE — 78452 HT MUSCLE IMAGE SPECT MULT: CPT | Performed by: NUCLEAR MEDICINE

## 2024-08-07 PROCEDURE — 6360000002 HC RX W HCPCS: Performed by: NUCLEAR MEDICINE

## 2024-08-07 PROCEDURE — 93016 CV STRESS TEST SUPVJ ONLY: CPT | Performed by: NUCLEAR MEDICINE

## 2024-08-07 PROCEDURE — 93018 CV STRESS TEST I&R ONLY: CPT | Performed by: NUCLEAR MEDICINE

## 2024-08-07 RX ORDER — TETRAKIS(2-METHOXYISOBUTYLISOCYANIDE)COPPER(I) TETRAFLUOROBORATE 1 MG/ML
9.2 INJECTION, POWDER, LYOPHILIZED, FOR SOLUTION INTRAVENOUS
Status: COMPLETED | OUTPATIENT
Start: 2024-08-07 | End: 2024-08-07

## 2024-08-07 RX ORDER — TETRAKIS(2-METHOXYISOBUTYLISOCYANIDE)COPPER(I) TETRAFLUOROBORATE 1 MG/ML
34.7 INJECTION, POWDER, LYOPHILIZED, FOR SOLUTION INTRAVENOUS
Status: COMPLETED | OUTPATIENT
Start: 2024-08-07 | End: 2024-08-07

## 2024-08-07 RX ORDER — REGADENOSON 0.08 MG/ML
0.4 INJECTION, SOLUTION INTRAVENOUS
Status: COMPLETED | OUTPATIENT
Start: 2024-08-07 | End: 2024-08-07

## 2024-08-07 RX ADMIN — REGADENOSON 0.4 MG: 0.08 INJECTION, SOLUTION INTRAVENOUS at 12:42

## 2024-08-07 RX ADMIN — Medication 9.2 MILLICURIE: at 11:46

## 2024-08-07 RX ADMIN — Medication 34.7 MILLICURIE: at 12:42

## 2024-08-10 DIAGNOSIS — M15.9 PRIMARY OSTEOARTHRITIS INVOLVING MULTIPLE JOINTS: ICD-10-CM

## 2024-08-12 RX ORDER — GABAPENTIN 100 MG/1
100 CAPSULE ORAL 2 TIMES DAILY
Qty: 180 CAPSULE | Refills: 0 | Status: SHIPPED | OUTPATIENT
Start: 2024-08-12 | End: 2024-11-10

## 2024-08-28 RX ORDER — BENAZEPRIL HYDROCHLORIDE 10 MG/1
10 TABLET ORAL DAILY
Qty: 90 TABLET | Refills: 3 | Status: SHIPPED | OUTPATIENT
Start: 2024-08-28

## 2024-08-28 NOTE — TELEPHONE ENCOUNTER
Anisha Fitzpatrick called requesting a refill on the following medications:  Requested Prescriptions     Pending Prescriptions Disp Refills    benazepril (LOTENSIN) 10 MG tablet 30 tablet      Pharmacy [facility]:  [facility]    Date of last visit: 07/10/2024  Date of next visit (if applicable): 07/17/2025

## 2024-11-16 DIAGNOSIS — M15.0 PRIMARY OSTEOARTHRITIS INVOLVING MULTIPLE JOINTS: ICD-10-CM

## 2024-11-18 RX ORDER — GABAPENTIN 100 MG/1
100 CAPSULE ORAL 2 TIMES DAILY
Qty: 180 CAPSULE | Refills: 0 | Status: SHIPPED | OUTPATIENT
Start: 2024-11-18 | End: 2025-02-16

## 2024-12-03 RX ORDER — ATORVASTATIN CALCIUM 40 MG/1
20 TABLET, FILM COATED ORAL DAILY
Qty: 45 TABLET | Refills: 3 | Status: SHIPPED | OUTPATIENT
Start: 2024-12-03

## 2024-12-05 ENCOUNTER — OFFICE VISIT (OUTPATIENT)
Dept: FAMILY MEDICINE CLINIC | Age: 84
End: 2024-12-05

## 2024-12-05 VITALS
BODY MASS INDEX: 35.05 KG/M2 | RESPIRATION RATE: 16 BRPM | OXYGEN SATURATION: 96 % | HEART RATE: 61 BPM | SYSTOLIC BLOOD PRESSURE: 143 MMHG | TEMPERATURE: 97.2 F | DIASTOLIC BLOOD PRESSURE: 88 MMHG | WEIGHT: 207.8 LBS

## 2024-12-05 DIAGNOSIS — N18.4 CHRONIC KIDNEY DISEASE, STAGE IV (SEVERE) (HCC): ICD-10-CM

## 2024-12-05 DIAGNOSIS — Z23 FLU VACCINE NEED: Primary | ICD-10-CM

## 2024-12-05 DIAGNOSIS — I10 PRIMARY HYPERTENSION: ICD-10-CM

## 2024-12-05 DIAGNOSIS — M19.09 PRIMARY OSTEOARTHRITIS OF OTHER SITE: ICD-10-CM

## 2024-12-05 ASSESSMENT — ENCOUNTER SYMPTOMS
SHORTNESS OF BREATH: 0
CHEST TIGHTNESS: 0
VOMITING: 0
ABDOMINAL PAIN: 0
RHINORRHEA: 0
CONSTIPATION: 0
COUGH: 0
DIARRHEA: 0
EYE DISCHARGE: 0

## 2024-12-05 NOTE — ASSESSMENT & PLAN NOTE
Controlled at home, with rare elevations at home    Orders:    CBC with Auto Differential; Future    Comprehensive Metabolic Panel, Fasting; Future    TSH with Reflex; Future    Lipid Panel; Future

## 2024-12-05 NOTE — PROGRESS NOTES
.   [facility]  [address] [place] [address].  [facility] [address]  Dept: [phone]  Dept Fax: [phone]    Visit type: Established patient    Reason for Visit: Follow-up Chronic Condition (6 month follow up - has had some hypertension episodes but has also had visitors for 3 weeks ) and Health Maintenance (See note /)      Assessment & Plan   Assessment and Plan       Assessment & Plan  Flu vaccine need    Flu vaccine today    Orders:    Influenza, FLUAD Trivalent, (age 65 y+), IM, Preservative Free, 0.5mL    Primary hypertension    Controlled at home, with rare elevations at home    Orders:    CBC with Auto Differential; Future    Comprehensive Metabolic Panel, Fasting; Future    TSH with Reflex; Future    Lipid Panel; Future    Primary osteoarthritis of other site    Continue neurontin         Chronic kidney disease, stage IV (severe) (HCC)    Continue fu with specialist           Return in about 6 months (around 6/5/2025) for AWV, chronic issues.       Subjective       Here for chronic issue follow up    OA   Onset over a year ago  Did try chiropractor and this helped  - is taking neurontin - is taking 3 times a day   Muscle relaxer as needed   Went to [facility] and had injection in her hip in the spring and pain has been controlled  Elbows have been sore with cold weather     Hx of HTN and HLD  Is following with Dr. Badillo  No CP or palpitations  Also takes lasix as needed   Update 1/11/2024 Since being on the steroid and her hip pain her BP started to become elevated at home. No CP or palpitations. Systolic 107-130 and diastolic 71-97. Did have some leg swelling and she only took this for three days and it helped. Also noted that around lonnie she had a cold in which she had been taking OTC cough medications   Update 12/5/2024 At home monitored BP and it was 114/72 at home this am. States that she has had company for the holidays over the last 3 weeks and notes her BP has fluctuated at that time.     Hx

## 2024-12-05 NOTE — PROGRESS NOTES
After obtaining consent, and per orders of EVELINE Goyal injection of Fluad 0.5ml given in Left deltoid by Chen Atkins MA. Patient instructed to remain in clinic for 20 minutes afterwards, and to report any adverse reaction to me immediately.    Patient tolerated injection well. VIS given to patient.     Immunizations Administered       Name Date Dose Route    Influenza, FLUAD, (age 65 y+), IM, Trivalent PF, 0.5mL 12/5/2024 0.5 mL Intramuscular    Site: Deltoid- Left    Lot: [id number]    NDC: 01186-524-58            Vaccine Information Sheet, \"Influenza - Inactivated\"  given to Anisha Fitzpatrick, or parent/legal guardian of  Anisha Fitzpatrick and verbalized understanding.    Patient responses:    Have you ever had a reaction to a flu vaccine? Yes and No  Do you have an allergy to eggs, neomycin or polymixin?  No  Do you have an allergy to Thimerosal, contact lens solution, or Merthiolate? No  Have you ever had Guillian Marengo Syndrome?  No  Do you have any current illness?  No  Do you have a temperature above 100 degrees? No  Are you pregnant? No  If pregnant, permission obtained from physician? No  Do you have an active neurological disorder? No      Flu vaccine given per order. Please see immunization tab.

## 2025-01-25 DIAGNOSIS — M15.0 PRIMARY OSTEOARTHRITIS INVOLVING MULTIPLE JOINTS: ICD-10-CM

## 2025-01-27 RX ORDER — GABAPENTIN 100 MG/1
100 CAPSULE ORAL 2 TIMES DAILY
Qty: 180 CAPSULE | Refills: 0 | Status: SHIPPED | OUTPATIENT
Start: 2025-01-27 | End: 2025-04-27

## 2025-04-22 DIAGNOSIS — M15.0 PRIMARY OSTEOARTHRITIS INVOLVING MULTIPLE JOINTS: ICD-10-CM

## 2025-04-22 NOTE — TELEPHONE ENCOUNTER
Anisha Fitzpatrick called requesting a refill on the following medications:  Requested Prescriptions     Pending Prescriptions Disp Refills    gabapentin (NEURONTIN) 100 MG capsule [Pharmacy Med Name: GABAPENTIN CAP]  0       Date of last visit: 12/5/2024  Date of next visit (if applicable):6/5/2025  Date of last refill: 01/27/2025  Pharmacy Name: Marilee Lima MA

## 2025-04-23 DIAGNOSIS — M15.0 PRIMARY OSTEOARTHRITIS INVOLVING MULTIPLE JOINTS: ICD-10-CM

## 2025-04-23 RX ORDER — GABAPENTIN 100 MG/1
100 CAPSULE ORAL 2 TIMES DAILY
Qty: 180 CAPSULE | Refills: 0 | Status: SHIPPED | OUTPATIENT
Start: 2025-04-23 | End: 2025-07-22

## 2025-04-23 RX ORDER — GABAPENTIN 100 MG/1
CAPSULE ORAL
Refills: 0 | OUTPATIENT
Start: 2025-04-23

## 2025-05-30 ENCOUNTER — LAB (OUTPATIENT)
Dept: FAMILY MEDICINE CLINIC | Age: 85
End: 2025-05-30
Payer: MEDICARE

## 2025-05-30 DIAGNOSIS — I10 PRIMARY HYPERTENSION: ICD-10-CM

## 2025-05-30 LAB
ALBUMIN SERPL BCG-MCNC: 3.9 G/DL (ref 3.4–4.9)
ALP SERPL-CCNC: 52 U/L (ref 38–126)
ALT SERPL W/O P-5'-P-CCNC: 15 U/L (ref 10–35)
ANION GAP SERPL CALC-SCNC: 11 MEQ/L (ref 8–16)
AST SERPL-CCNC: 23 U/L (ref 10–35)
BASOPHILS ABSOLUTE: 0.1 THOU/MM3 (ref 0–0.1)
BASOPHILS NFR BLD AUTO: 1.2 %
BILIRUB SERPL-MCNC: 0.3 MG/DL (ref 0.3–1.2)
BUN SERPL-MCNC: 25 MG/DL (ref 8–23)
CALCIUM SERPL-MCNC: 9.4 MG/DL (ref 8.8–10.2)
CHLORIDE SERPL-SCNC: 107 MEQ/L (ref 98–111)
CHOLEST SERPL-MCNC: 173 MG/DL (ref 100–199)
CO2 SERPL-SCNC: 25 MEQ/L (ref 22–29)
CREAT SERPL-MCNC: 1.4 MG/DL (ref 0.5–0.9)
DEPRECATED RDW RBC AUTO: 45.4 FL (ref 35–45)
EOSINOPHIL NFR BLD AUTO: 5.8 %
EOSINOPHILS ABSOLUTE: 0.3 THOU/MM3 (ref 0–0.4)
ERYTHROCYTE [DISTWIDTH] IN BLOOD BY AUTOMATED COUNT: 13.5 % (ref 11.5–14.5)
GFR SERPL CREATININE-BSD FRML MDRD: 37 ML/MIN/1.73M2
GLUCOSE FASTING: 105 MG/DL (ref 74–109)
HCT VFR BLD AUTO: 41.8 % (ref 37–47)
HDLC SERPL-MCNC: 48 MG/DL
HGB BLD-MCNC: 13.5 GM/DL (ref 12–16)
IMM GRANULOCYTES # BLD AUTO: 0.02 THOU/MM3 (ref 0–0.07)
IMM GRANULOCYTES NFR BLD AUTO: 0.3 %
LDLC SERPL CALC-MCNC: 102 MG/DL
LYMPHOCYTES ABSOLUTE: 1.6 THOU/MM3 (ref 1–4.8)
LYMPHOCYTES NFR BLD AUTO: 27.8 %
MCH RBC QN AUTO: 29.5 PG (ref 26–33)
MCHC RBC AUTO-ENTMCNC: 32.3 GM/DL (ref 32.2–35.5)
MCV RBC AUTO: 91.3 FL (ref 81–99)
MONOCYTES ABSOLUTE: 0.4 THOU/MM3 (ref 0.4–1.3)
MONOCYTES NFR BLD AUTO: 6.7 %
NEUTROPHILS ABSOLUTE: 3.4 THOU/MM3 (ref 1.8–7.7)
NEUTROPHILS NFR BLD AUTO: 58.2 %
NRBC BLD AUTO-RTO: 0 /100 WBC
PLATELET # BLD AUTO: 252 THOU/MM3 (ref 130–400)
PMV BLD AUTO: 9.8 FL (ref 9.4–12.4)
POTASSIUM SERPL-SCNC: 4.4 MEQ/L (ref 3.5–5.2)
PROT SERPL-MCNC: 6.6 G/DL (ref 6.4–8.3)
RBC # BLD AUTO: 4.58 MILL/MM3 (ref 4.2–5.4)
SODIUM SERPL-SCNC: 143 MEQ/L (ref 135–145)
T4 FREE SERPL-MCNC: 1.2 NG/DL (ref 0.92–1.68)
TRIGL SERPL-MCNC: 114 MG/DL (ref 0–199)
TSH SERPL DL<=0.05 MIU/L-ACNC: 4.24 UIU/ML (ref 0.27–4.2)
WBC # BLD AUTO: 5.8 THOU/MM3 (ref 4.8–10.8)

## 2025-05-30 PROCEDURE — 36415 COLL VENOUS BLD VENIPUNCTURE: CPT | Performed by: STUDENT IN AN ORGANIZED HEALTH CARE EDUCATION/TRAINING PROGRAM

## 2025-06-02 ENCOUNTER — RESULTS FOLLOW-UP (OUTPATIENT)
Dept: FAMILY MEDICINE CLINIC | Age: 85
End: 2025-06-02

## 2025-06-03 NOTE — PROGRESS NOTES
Health Maintenance Due   Topic Date Due    DTaP/Tdap/Td vaccine (1 - Tdap) Never done    Pneumococcal 50+ years Vaccine (1 of 1 - PCV) Never done    DEXA (modify frequency per FRAX score)  Never done    Respiratory Syncytial Virus (RSV) Pregnant or age 60 yrs+ (1 - 1-dose 75+ series) Never done    COVID-19 Vaccine (4 - 2024-25 season) 09/01/2024    Annual Wellness Visit (Medicare)  05/14/2025    Depression Screen  05/13/2025

## 2025-06-05 ENCOUNTER — OFFICE VISIT (OUTPATIENT)
Dept: FAMILY MEDICINE CLINIC | Age: 85
End: 2025-06-05

## 2025-06-05 VITALS
RESPIRATION RATE: 16 BRPM | HEIGHT: 65 IN | DIASTOLIC BLOOD PRESSURE: 82 MMHG | OXYGEN SATURATION: 96 % | WEIGHT: 205.2 LBS | BODY MASS INDEX: 34.19 KG/M2 | HEART RATE: 89 BPM | SYSTOLIC BLOOD PRESSURE: 112 MMHG | TEMPERATURE: 98 F

## 2025-06-05 DIAGNOSIS — M15.0 PRIMARY OSTEOARTHRITIS INVOLVING MULTIPLE JOINTS: ICD-10-CM

## 2025-06-05 DIAGNOSIS — Z00.00 MEDICARE ANNUAL WELLNESS VISIT, SUBSEQUENT: Primary | ICD-10-CM

## 2025-06-05 DIAGNOSIS — N18.4 CHRONIC KIDNEY DISEASE, STAGE IV (SEVERE) (HCC): ICD-10-CM

## 2025-06-05 DIAGNOSIS — I10 PRIMARY HYPERTENSION: ICD-10-CM

## 2025-06-05 SDOH — ECONOMIC STABILITY: FOOD INSECURITY: WITHIN THE PAST 12 MONTHS, YOU WORRIED THAT YOUR FOOD WOULD RUN OUT BEFORE YOU GOT MONEY TO BUY MORE.: NEVER TRUE

## 2025-06-05 SDOH — ECONOMIC STABILITY: FOOD INSECURITY: WITHIN THE PAST 12 MONTHS, THE FOOD YOU BOUGHT JUST DIDN'T LAST AND YOU DIDN'T HAVE MONEY TO GET MORE.: NEVER TRUE

## 2025-06-05 ASSESSMENT — PATIENT HEALTH QUESTIONNAIRE - PHQ9
1. LITTLE INTEREST OR PLEASURE IN DOING THINGS: NOT AT ALL
SUM OF ALL RESPONSES TO PHQ QUESTIONS 1-9: 0
SUM OF ALL RESPONSES TO PHQ QUESTIONS 1-9: 0
2. FEELING DOWN, DEPRESSED OR HOPELESS: NOT AT ALL
SUM OF ALL RESPONSES TO PHQ QUESTIONS 1-9: 0
SUM OF ALL RESPONSES TO PHQ QUESTIONS 1-9: 0

## 2025-06-05 NOTE — PROGRESS NOTES
Medicare Annual Wellness Visit    Anisha Fitzpatrick is here for Medicare AWV (Would like to discuss blood work. Notes has stopped the blood pressure medication because back in April her blood pressure was reading low. )    Assessment & Plan  1. Hypotension.  - Blood pressure readings have been consistently low, with systolic values in the 80s and 90s.  - No symptoms such as chest pain, palpitations, or shortness of breath reported.  - Advised to maintain a record of blood pressure readings for further evaluation during the upcoming cardiology appointment in 07/2025.  - Discontinued antihypertensive medication due to low readings.    2. Arthritis.  - Current regimen of Neurontin 300 mg twice daily and omega-3 supplements is sufficient for managing arthritis pain.  - Occasional stiffness, particularly in the morning, but able to perform daily activities.  - Advised to continue this regimen and monitor for any changes in symptoms.  - No significant issues reported with the current treatment plan.    3. Hypothyroidism.  - Thyroid function tests indicate a slight deviation from the norm.  - Symptoms such as constipation, brain fog, weight gain, muscle aches, dry skin, dry hair, and heat or cold intolerance can be attributed to age or hypothyroidism.  - Recommended to re-evaluate thyroid function in 6 months before considering the initiation of thyroid supplementation.  - Discussed the potential impact of hypothyroidism on metabolism and weight management.    4. Health maintenance.  - Expressed desire to postpone osteoporosis screening and vaccinations.  - Encouraged to continue physical activity regimen, including walking and weight-bearing exercises.  - Regular exercise through walking in stores and maintaining an active lifestyle at home.  - Follow-up scheduled for 12/2025.    Medicare annual wellness visit, subsequent  Primary osteoarthritis involving multiple joints  Chronic kidney disease, stage IV (severe)

## 2025-07-17 ENCOUNTER — OFFICE VISIT (OUTPATIENT)
Dept: CARDIOLOGY CLINIC | Age: 85
End: 2025-07-17
Payer: MEDICARE

## 2025-07-17 VITALS
SYSTOLIC BLOOD PRESSURE: 114 MMHG | HEART RATE: 64 BPM | HEIGHT: 65 IN | DIASTOLIC BLOOD PRESSURE: 82 MMHG | WEIGHT: 202 LBS | BODY MASS INDEX: 33.66 KG/M2

## 2025-07-17 DIAGNOSIS — I95.0 IDIOPATHIC HYPOTENSION: ICD-10-CM

## 2025-07-17 DIAGNOSIS — R06.02 SHORTNESS OF BREATH: Primary | ICD-10-CM

## 2025-07-17 DIAGNOSIS — R06.02 SOB (SHORTNESS OF BREATH) ON EXERTION: ICD-10-CM

## 2025-07-17 PROCEDURE — G8417 CALC BMI ABV UP PARAM F/U: HCPCS | Performed by: NUCLEAR MEDICINE

## 2025-07-17 PROCEDURE — 1159F MED LIST DOCD IN RCRD: CPT | Performed by: NUCLEAR MEDICINE

## 2025-07-17 PROCEDURE — 3079F DIAST BP 80-89 MM HG: CPT | Performed by: NUCLEAR MEDICINE

## 2025-07-17 PROCEDURE — 1036F TOBACCO NON-USER: CPT | Performed by: NUCLEAR MEDICINE

## 2025-07-17 PROCEDURE — 1123F ACP DISCUSS/DSCN MKR DOCD: CPT | Performed by: NUCLEAR MEDICINE

## 2025-07-17 PROCEDURE — 3074F SYST BP LT 130 MM HG: CPT | Performed by: NUCLEAR MEDICINE

## 2025-07-17 PROCEDURE — G8400 PT W/DXA NO RESULTS DOC: HCPCS | Performed by: NUCLEAR MEDICINE

## 2025-07-17 PROCEDURE — G8427 DOCREV CUR MEDS BY ELIG CLIN: HCPCS | Performed by: NUCLEAR MEDICINE

## 2025-07-17 PROCEDURE — 93000 ELECTROCARDIOGRAM COMPLETE: CPT | Performed by: NUCLEAR MEDICINE

## 2025-07-17 PROCEDURE — 1090F PRES/ABSN URINE INCON ASSESS: CPT | Performed by: NUCLEAR MEDICINE

## 2025-07-17 PROCEDURE — 99213 OFFICE O/P EST LOW 20 MIN: CPT | Performed by: NUCLEAR MEDICINE

## 2025-07-17 RX ORDER — MIDODRINE HYDROCHLORIDE 2.5 MG/1
2.5 TABLET ORAL 3 TIMES DAILY
Qty: 270 TABLET | Refills: 3 | Status: SHIPPED | OUTPATIENT
Start: 2025-07-17

## 2025-07-17 NOTE — PROGRESS NOTES
[facility] [place] [facility]  [facility]  [address]  [address]  [facility] [address]  Dept: [phone]  Dept Fax: [phone]  Loc: [phone]    Visit Date: 7/17/2025    Anisha Fitzpatrick is a 84 y.o. female who presents todayfor:  Chief Complaint   Patient presents with    Follow-up    Valvular Heart Disease    Hyperlipidemia   Running lower BP  No dizziness  Stopped her meds  No syncope  No chest pain   Some baseline dyspnea  Exertional   Known AS   Moderate AS        HPI:  HPI  Past Medical History:   Diagnosis Date    Chronic kidney disease, stage IV (severe) (HCC)     Osteoarthritis     Other and unspecified hyperlipidemia     Unspecified essential hypertension       Past Surgical History:   Procedure Laterality Date    ELBOW SURGERY      SHOULDER SURGERY  2016    TUMOR REMOVAL       Family History   Problem Relation Age of Onset    Atrial Fibrillation Mother      Social History     Tobacco Use    Smoking status: Never    Smokeless tobacco: Never   Substance Use Topics    Alcohol use: No      Current Outpatient Medications   Medication Sig Dispense Refill    gabapentin (NEURONTIN) 100 MG capsule Take 1 capsule by mouth in the morning and at bedtime for 90 days. 180 capsule 0    atorvastatin (LIPITOR) 40 MG tablet TAKE ONE-HALF TABLET BY MOUTH  DAILY 45 tablet 3    NONFORMULARY Omega XL Joint and Muscle Relief      Handicap Placard MISC by Does not apply route Expires 5 year from date of prescription 1 each 0    furosemide (LASIX) 20 MG tablet TAKE 1 TABLET BY MOUTH DAILY AS  NEEDED FOR SWELLING 90 tablet 3    Cholecalciferol (VITAMIN D-3 PO) Take by mouth daily      Cyanocobalamin (VITAMIN B 12 PO) Take by mouth daily      Ferrous Gluconate (IRON 27 PO) Take by mouth as needed      Probiotic Product (PROBIOTIC-10 PO) Take by mouth      Multiple Vitamins-Minerals (THERAPEUTIC MULTIVITAMIN-MINERALS) tablet Take 1 tablet by mouth daily      Coenzyme Q10 (COQ-10) 100 MG CAPS

## 2025-08-29 DIAGNOSIS — M15.0 PRIMARY OSTEOARTHRITIS INVOLVING MULTIPLE JOINTS: ICD-10-CM

## 2025-09-02 RX ORDER — GABAPENTIN 100 MG/1
100 CAPSULE ORAL 2 TIMES DAILY
Qty: 180 CAPSULE | Refills: 3 | Status: SHIPPED | OUTPATIENT
Start: 2025-09-02 | End: 2025-12-01